# Patient Record
Sex: FEMALE | Race: BLACK OR AFRICAN AMERICAN | NOT HISPANIC OR LATINO | Employment: OTHER | URBAN - METROPOLITAN AREA
[De-identification: names, ages, dates, MRNs, and addresses within clinical notes are randomized per-mention and may not be internally consistent; named-entity substitution may affect disease eponyms.]

---

## 2017-01-19 ENCOUNTER — TELEPHONE (OUTPATIENT)
Dept: GASTROENTEROLOGY | Facility: CLINIC | Age: 82
End: 2017-01-19

## 2017-01-19 RX ORDER — LANSOPRAZOLE 30 MG/1
30 CAPSULE, DELAYED RELEASE ORAL EVERY MORNING
Qty: 30 CAPSULE | Refills: 0 | Status: SHIPPED | OUTPATIENT
Start: 2017-01-19 | End: 2017-02-03 | Stop reason: SDUPTHER

## 2017-01-19 NOTE — TELEPHONE ENCOUNTER
Called pt back. Pt states her lansoprazole needs a PA and she needs to stay on this medication as she has tried mutiple other meds and this med works well for her. Advised that we will work on getting the PA and she is due for an annual appt. Pt verb understanding. Appt made for 2/14/17 at 10:30 AM. Medication e-scribed as pt should be out of refills. We will work on PA once received.

## 2017-01-19 NOTE — TELEPHONE ENCOUNTER
----- Message from Haile Montiel sent at 1/19/2017  1:02 PM EST -----  Regarding: CHANGES IN MED   Contact: [phone]  DR. THORPE- PT CALLED STATED INSURANCE  WILL NOT COVER THE LANSOPRAZOLE 30 MG  PT ASKING FOR A DIFFERENT MEDICATION. [facility]# [phone]

## 2017-01-27 RX ORDER — LANSOPRAZOLE 30 MG/1
CAPSULE, DELAYED RELEASE ORAL
Qty: 30 CAPSULE | Refills: 10 | OUTPATIENT
Start: 2017-01-27

## 2017-01-30 ENCOUNTER — DOCUMENTATION (OUTPATIENT)
Dept: GASTROENTEROLOGY | Facility: CLINIC | Age: 82
End: 2017-01-30

## 2017-02-03 ENCOUNTER — TELEPHONE (OUTPATIENT)
Dept: GASTROENTEROLOGY | Facility: CLINIC | Age: 82
End: 2017-02-03

## 2017-02-03 RX ORDER — LANSOPRAZOLE 30 MG/1
CAPSULE, DELAYED RELEASE ORAL
Qty: 30 CAPSULE | Refills: 0 | Status: SHIPPED | OUTPATIENT
Start: 2017-02-03 | End: 2017-02-14 | Stop reason: SDUPTHER

## 2017-02-03 NOTE — TELEPHONE ENCOUNTER
----- Message from Maci Pritchard sent at 2/3/2017 10:08 AM EST -----  Regarding: PT CALLED - REFILL  Contact: [phone]  SEE PENDING REFILL REQUEST FOR LANSOPRAZOLE. STATED [facility] HAS APPROVED. PLEASE CALL SCRIPT IN TODAY. PT IS OUT OF MEDS. [facility]. THANKS

## 2017-02-03 NOTE — TELEPHONE ENCOUNTER
----- Message from Maci Pritchard sent at 2/3/2017 10:08 AM EST -----  Regarding: PT CALLED - REFILL  Contact: [phone]  SEE PENDING REFILL REQUEST FOR LANSOPRAZOLE. STATED [facility] HAS APPROVED. PLEASE CALL SCRIPT IN TODAY. PT IS OUT OF MEDS. [facility]. THANKS

## 2017-02-03 NOTE — TELEPHONE ENCOUNTER
Medication e-scribed.     Called pt and advised that her medication has been called in to her [facility] pharmacy. Pt verb understanding.

## 2017-02-14 ENCOUNTER — OFFICE VISIT (OUTPATIENT)
Dept: GASTROENTEROLOGY | Facility: CLINIC | Age: 82
End: 2017-02-14

## 2017-02-14 VITALS — HEIGHT: 65 IN | BODY MASS INDEX: 43.22 KG/M2 | WEIGHT: 259.4 LBS

## 2017-02-14 DIAGNOSIS — K21.9 GASTROESOPHAGEAL REFLUX DISEASE WITHOUT ESOPHAGITIS: Primary | ICD-10-CM

## 2017-02-14 PROCEDURE — 99212 OFFICE O/P EST SF 10 MIN: CPT | Performed by: INTERNAL MEDICINE

## 2017-02-14 RX ORDER — BENAZEPRIL HYDROCHLORIDE 20 MG/1
40 TABLET ORAL DAILY
COMMUNITY
End: 2021-11-16

## 2017-02-14 RX ORDER — LANSOPRAZOLE 30 MG/1
30 CAPSULE, DELAYED RELEASE ORAL DAILY
Qty: 30 CAPSULE | Refills: 11 | Status: SHIPPED | OUTPATIENT
Start: 2017-02-14 | End: 2018-01-25 | Stop reason: SDUPTHER

## 2017-02-14 NOTE — PROGRESS NOTES
Chief Complaint   Patient presents with   • Heartburn       Angelic Cardona is a  81 y.o. female here for a follow up visit for GERD.    HPI  Patient with history of GERD doing rather well on current regimen.  Patient may need to be removed now for here for follow-up.  Patient does report occasional early satiety particularly with high gluten foods like bagels.  Otherwise doing well.  Past Medical History   Diagnosis Date   • GERD (gastroesophageal reflux disease)    • Hyperlipidemia    • Hypertension          Current Outpatient Prescriptions:   •  amLODIPine (NORVASC) 10 MG tablet, Take 10 mg by mouth Daily., Disp: , Rfl:   •  benazepril (LOTENSIN) 20 MG tablet, Take 40 mg by mouth Daily., Disp: , Rfl:   •  Calcium Carbonate-Vit D-Min (CALCIUM 1200 PO), Take  by mouth., Disp: , Rfl:   •  carvedilol (COREG) 25 MG tablet, Take 25 mg by mouth 2 (Two) Times a Day With Meals., Disp: , Rfl:   •  DICLOFENAC PO, Take 75 mg by mouth., Disp: , Rfl:   •  Fexofenadine HCl (ALLERGY 24-HR PO), Take  by mouth., Disp: , Rfl:   •  furosemide (LASIX) 20 MG tablet, Take 20 mg by mouth 2 (Two) Times a Day., Disp: , Rfl:   •  lansoprazole (PREVACID) 30 MG capsule, Take 1 capsule by mouth Daily., Disp: 30 capsule, Rfl: 11  •  methyldopa (ALDOMET) 500 MG tablet, Take 500 mg by mouth 3 (Three) Times a Day., Disp: , Rfl:   •  Multiple Vitamins-Minerals (MULTIVITAMIN ADULT PO), Take  by mouth., Disp: , Rfl:   •  Simethicone (GAS RELIEF 80 PO), Take  by mouth., Disp: , Rfl:   •  timolol (TIMOPTIC-XR) 0.25 % ophthalmic gel-forming, 1 drop Daily., Disp: , Rfl:   •  albuterol (PROVENTIL HFA;VENTOLIN HFA) 108 (90 BASE) MCG/ACT inhaler, Inhale 2 puffs Every 4 (Four) Hours As Needed for wheezing., Disp: 1 inhaler, Rfl: 0  •  amLODIPine-benazepril (LOTREL) 5-40 MG per capsule, Take 1 capsule by mouth Daily., Disp: , Rfl:   •  azithromycin (ZITHROMAX) 250 MG tablet, Take 2 tablets the first day, then 1 tablet daily for 4 days., Disp: 6 tablet, Rfl:  0  •  guaiFENesin (MUCINEX) 600 MG 12 hr tablet, Take 2 tablets by mouth 2 (Two) Times a Day., Disp: 30 tablet, Rfl: 0  •  meloxicam (MOBIC) 15 MG tablet, Take 15 mg by mouth Daily., Disp: , Rfl:     No Known Allergies    Social History     Social History   • Marital status:      Spouse name: N/A   • Number of children: N/A   • Years of education: N/A     Occupational History   • Not on file.     Social History Main Topics   • Smoking status: Never Smoker   • Smokeless tobacco: Not on file   • Alcohol use No   • Drug use: No   • Sexual activity: Not on file     Other Topics Concern   • Not on file     Social History Narrative       Family History   Problem Relation Age of Onset   • Colon cancer Father        Review of Systems   Constitutional: Negative.    HENT: Negative.    Respiratory: Negative.    Cardiovascular: Negative.    Gastrointestinal: Negative.    Endocrine: Negative.    Musculoskeletal: Negative.    Skin: Negative.        There were no vitals filed for this visit.    Physical Exam   Constitutional: She is oriented to person, place, and time. She appears well-developed and well-nourished.   HENT:   Head: Normocephalic and atraumatic.   Eyes: EOM are normal. Pupils are equal, round, and reactive to light. No scleral icterus.   Cardiovascular: Normal rate, regular rhythm and normal heart sounds.  Exam reveals no gallop and no friction rub.    No murmur heard.  Pulmonary/Chest: Effort normal. She has no wheezes. She has no rales.   Abdominal: Soft. Bowel sounds are normal. She exhibits no distension and no mass. There is no tenderness. No hernia.   Musculoskeletal: Normal range of motion.   Neurological: She is alert and oriented to person, place, and time. No cranial nerve deficit.   Skin: Skin is dry.   Psychiatric: She has a normal mood and affect. Her behavior is normal.       No visits with results within 2 Month(s) from this visit.  Latest known visit with results is:    Hospital Outpatient  Visit on 01/11/2016   Component Date Value Ref Range Status   • CONVERTED (HISTORICAL) CASE TYPE 01/11/2016 Surgical Pathology   Final   • CONVERTED (HISTORICAL) ACCESSION N* 01/11/2016    Final   • CONVERTED (HISTORICAL) RESULT STAT* 01/11/2016 FINAL   Final   • CONVERTED (HISTORICAL) SPECIMEN DE* 01/11/2016 DESC. COLON POLYP lc   Final       Angelic was seen today for heartburn.    Diagnoses and all orders for this visit:    Gastroesophageal reflux disease without esophagitis    Other orders  -     lansoprazole (PREVACID) 30 MG capsule; Take 1 capsule by mouth Daily.      Patient here for medical refill.  Patient doing well on her current PPI regimen.  Patient does have some issues with gas and constipation but as long as she takes a probiotic and her other medications she does rather well.  Patient does report some early fullness and bloating.  Patient has reviewed smaller more frequent meals is an 8 to improve digestion.  Patient also told to try avoiding high gluten foods which may promote bloating.  We'll follow-up clinically and renew her PPI as needed.

## 2018-01-25 ENCOUNTER — TELEPHONE (OUTPATIENT)
Dept: GASTROENTEROLOGY | Facility: CLINIC | Age: 83
End: 2018-01-25

## 2018-01-25 RX ORDER — LANSOPRAZOLE 30 MG/1
30 CAPSULE, DELAYED RELEASE ORAL DAILY
Qty: 30 CAPSULE | Refills: 0 | Status: SHIPPED | OUTPATIENT
Start: 2018-01-25 | End: 2018-02-20 | Stop reason: SDUPTHER

## 2018-01-25 NOTE — TELEPHONE ENCOUNTER
Returned patient's phone call. She states she is out of her Lansoprazole. Advised she will need her yearly f/u visit with Dr. Acosta. F/u visit scheduled for 2/20/18@0930.  Patient verb understanding and is in agreement with the plan. Lansoprazole e-scribed to patient's pharmacy. She refused NP visit preferred seeing Dr. Acosta.

## 2018-01-25 NOTE — TELEPHONE ENCOUNTER
----- Message from Maci Pritchard sent at 1/25/2018  2:07 PM EST -----  Regarding: PT CALLED - REFILL ON LANSOPRAZOLE  Contact: [phone]  PT STATED PHARM HAS FAXED REQUEST.

## 2018-01-26 ENCOUNTER — PRIOR AUTHORIZATION (OUTPATIENT)
Dept: GASTROENTEROLOGY | Facility: CLINIC | Age: 83
End: 2018-01-26

## 2018-02-20 ENCOUNTER — OFFICE VISIT (OUTPATIENT)
Dept: GASTROENTEROLOGY | Facility: CLINIC | Age: 83
End: 2018-02-20

## 2018-02-20 VITALS
BODY MASS INDEX: 43.49 KG/M2 | TEMPERATURE: 97.5 F | WEIGHT: 261 LBS | HEIGHT: 65 IN | SYSTOLIC BLOOD PRESSURE: 132 MMHG | DIASTOLIC BLOOD PRESSURE: 78 MMHG

## 2018-02-20 DIAGNOSIS — K21.9 GASTROESOPHAGEAL REFLUX DISEASE WITHOUT ESOPHAGITIS: Primary | ICD-10-CM

## 2018-02-20 PROCEDURE — 99212 OFFICE O/P EST SF 10 MIN: CPT | Performed by: INTERNAL MEDICINE

## 2018-02-20 RX ORDER — LANSOPRAZOLE 30 MG/1
30 CAPSULE, DELAYED RELEASE ORAL DAILY
Qty: 90 CAPSULE | Refills: 3 | Status: SHIPPED | OUTPATIENT
Start: 2018-02-20 | End: 2019-02-16 | Stop reason: SDUPTHER

## 2018-02-20 NOTE — PROGRESS NOTES
Chief Complaint   Patient presents with   • Heartburn       Angelic Cardona is a  82 y.o. female here for a follow up visit for GERD.    HPI    Patient 82-year-old female with history of GERD as well as hyperlipidemia and hypertension here for med refill.  Patient currently reports no GI complaints on current medication.  Patient denies nausea vomiting no change in bowel habits doing well otherwise.    Past Medical History:   Diagnosis Date   • GERD (gastroesophageal reflux disease)    • Hyperlipidemia    • Hypertension          Current Outpatient Prescriptions:   •  amLODIPine (NORVASC) 10 MG tablet, Take 10 mg by mouth Daily., Disp: , Rfl:   •  apixaban (ELIQUIS) 5 MG tablet tablet, Take 5 mg by mouth 2 (Two) Times a Day., Disp: , Rfl:   •  benazepril (LOTENSIN) 20 MG tablet, Take 40 mg by mouth Daily., Disp: , Rfl:   •  Calcium Carbonate-Vit D-Min (CALCIUM 1200 PO), Take  by mouth., Disp: , Rfl:   •  carvedilol (COREG) 25 MG tablet, Take 25 mg by mouth 2 (Two) Times a Day With Meals., Disp: , Rfl:   •  DICLOFENAC PO, Take 75 mg by mouth., Disp: , Rfl:   •  furosemide (LASIX) 20 MG tablet, Take 20 mg by mouth 2 (Two) Times a Day., Disp: , Rfl:   •  lansoprazole (PREVACID) 30 MG capsule, Take 1 capsule by mouth Daily., Disp: 90 capsule, Rfl: 3  •  methyldopa (ALDOMET) 500 MG tablet, Take 500 mg by mouth 3 (Three) Times a Day., Disp: , Rfl:   •  Multiple Vitamins-Minerals (MULTIVITAMIN ADULT PO), Take  by mouth., Disp: , Rfl:   •  Probiotic Product (DIGESTIVE ADVANTAGE PO), Take  by mouth., Disp: , Rfl:   •  Simethicone (GAS RELIEF 80 PO), Take  by mouth., Disp: , Rfl:   •  timolol (TIMOPTIC-XR) 0.25 % ophthalmic gel-forming, 1 drop Daily., Disp: , Rfl:   •  amLODIPine-benazepril (LOTREL) 5-40 MG per capsule, Take 1 capsule by mouth Daily., Disp: , Rfl:     Allergies   Allergen Reactions   • Shellfish-Derived Products        Social History     Social History   • Marital status:      Spouse name: N/A   • Number  of children: N/A   • Years of education: N/A     Occupational History   • Not on file.     Social History Main Topics   • Smoking status: Never Smoker   • Smokeless tobacco: Not on file   • Alcohol use No   • Drug use: No   • Sexual activity: Not on file     Other Topics Concern   • Not on file     Social History Narrative       Family History   Problem Relation Age of Onset   • Colon cancer Father        Review of Systems   Constitutional: Negative.    HENT: Negative.    Respiratory: Negative.    Cardiovascular: Negative.    Gastrointestinal: Negative.    Skin: Negative.    Hematological: Negative.        Vitals:    02/20/18 0946   BP: 132/78   Temp: 97.5 °F (36.4 °C)       Physical Exam    No visits with results within 2 Month(s) from this visit.  Latest known visit with results is:    Admission on 11/06/2017, Discharged on 11/06/2017   Component Date Value Ref Range Status   • Urine Culture 11/06/2017 Final report   Final   • Result 1 11/06/2017 No growth   Final   • Color 11/06/2017 Orange* Yellow, Straw, Dark Yellow, Bell Final   • Clarity, UA 11/06/2017 Slightly Cloudy* Clear Final   • Glucose, UA 11/06/2017 100 mg/dL* Negative, 1000 mg/dL (3+) mg/dL Final   • Bilirubin 11/06/2017 Negative  Negative Final   • Ketones, UA 11/06/2017 Negative  Negative Final   • Specific Gravity  11/06/2017 1.015  1.005 - 1.030 Final   • Blood, UA 11/06/2017 Negative  Negative Final   • pH, Urine 11/06/2017 6.0  5.0 - 8.0 Final   • Protein, POC 11/06/2017 Trace* Negative mg/dL Final   • Urobilinogen, UA 11/06/2017 1 E.U./dL * Normal Final   • Leukocytes 11/06/2017 Negative  Negative Final   • Nitrite, UA 11/06/2017 Positive* Negative Final       Angelic was seen today for heartburn.    Diagnoses and all orders for this visit:    Gastroesophageal reflux disease without esophagitis    Other orders  -     lansoprazole (PREVACID) 30 MG capsule; Take 1 capsule by mouth Daily.      Patient 82-year-old female with history GERD doing  very well on Prevacid.  Patient with no GI complaints will refill her meds and follow-up clinically.  Would recommend primary monitor bone density and if patient is noted with low density would recommend adding acidic juice or vitamin C with the calcium replacement.  We'll follow-up clinically for GERD.

## 2019-02-18 ENCOUNTER — PRIOR AUTHORIZATION (OUTPATIENT)
Dept: GASTROENTEROLOGY | Facility: CLINIC | Age: 84
End: 2019-02-18

## 2019-02-18 RX ORDER — LANSOPRAZOLE 30 MG/1
30 CAPSULE, DELAYED RELEASE ORAL DAILY
Qty: 30 CAPSULE | Refills: 0 | Status: SHIPPED | OUTPATIENT
Start: 2019-02-18 | End: 2019-03-16 | Stop reason: SDUPTHER

## 2019-03-18 RX ORDER — LANSOPRAZOLE 30 MG/1
30 CAPSULE, DELAYED RELEASE ORAL DAILY
Qty: 30 CAPSULE | Refills: 0 | Status: SHIPPED | OUTPATIENT
Start: 2019-03-18 | End: 2019-04-13 | Stop reason: SDUPTHER

## 2019-04-04 ENCOUNTER — OFFICE VISIT (OUTPATIENT)
Dept: GASTROENTEROLOGY | Facility: CLINIC | Age: 84
End: 2019-04-04

## 2019-04-04 VITALS
DIASTOLIC BLOOD PRESSURE: 68 MMHG | TEMPERATURE: 97.7 F | BODY MASS INDEX: 42.02 KG/M2 | HEIGHT: 65 IN | WEIGHT: 252.2 LBS | SYSTOLIC BLOOD PRESSURE: 116 MMHG

## 2019-04-04 DIAGNOSIS — K59.04 CHRONIC IDIOPATHIC CONSTIPATION: ICD-10-CM

## 2019-04-04 DIAGNOSIS — K21.00 GASTROESOPHAGEAL REFLUX DISEASE WITH ESOPHAGITIS: Primary | ICD-10-CM

## 2019-04-04 PROCEDURE — 99213 OFFICE O/P EST LOW 20 MIN: CPT | Performed by: INTERNAL MEDICINE

## 2019-04-04 RX ORDER — PRAVASTATIN SODIUM 10 MG
10 TABLET ORAL DAILY
Refills: 0 | COMMUNITY
Start: 2019-02-18 | End: 2021-11-16

## 2019-04-04 NOTE — PROGRESS NOTES
Chief Complaint   Patient presents with   • Abdominal Pain   • Constipation   • Rectal Bleeding       Angelic Cardona is a  83 y.o. female here for a follow up visit for GERD and chronic constipation.    HPI     Patient 83-year-old female with history of GERD hypertension hyperlipidemia reports doing very well on her Prevacid therapy for reflux.  Patient just got her prior off cleared last month and is doing well clinically.  Patient also reports however long history of self treated constipation with as needed MiraLAX that only partially effective.  Patient has noted little blood on the tissue when she wipes related to particular hard stools or straining.    Past Medical History:   Diagnosis Date   • GERD (gastroesophageal reflux disease)    • Hyperlipidemia    • Hypertension          Current Outpatient Medications:   •  amLODIPine (NORVASC) 10 MG tablet, Take 10 mg by mouth Daily., Disp: , Rfl:   •  amLODIPine-benazepril (LOTREL) 5-40 MG per capsule, Take 1 capsule by mouth Daily., Disp: , Rfl:   •  apixaban (ELIQUIS) 5 MG tablet tablet, Take 5 mg by mouth 2 (Two) Times a Day., Disp: , Rfl:   •  benazepril (LOTENSIN) 20 MG tablet, Take 40 mg by mouth Daily., Disp: , Rfl:   •  Calcium Carbonate-Vit D-Min (CALCIUM 1200 PO), Take  by mouth., Disp: , Rfl:   •  carvedilol (COREG) 25 MG tablet, Take 25 mg by mouth 2 (Two) Times a Day With Meals., Disp: , Rfl:   •  DICLOFENAC PO, Take 75 mg by mouth., Disp: , Rfl:   •  furosemide (LASIX) 20 MG tablet, Take 20 mg by mouth 2 (Two) Times a Day., Disp: , Rfl:   •  lansoprazole (PREVACID) 30 MG capsule, Take 1 capsule by mouth Daily., Disp: 30 capsule, Rfl: 0  •  methyldopa (ALDOMET) 500 MG tablet, Take 500 mg by mouth 3 (Three) Times a Day., Disp: , Rfl:   •  Multiple Vitamins-Minerals (MULTIVITAMIN ADULT PO), Take  by mouth., Disp: , Rfl:   •  pravastatin (PRAVACHOL) 10 MG tablet, Take 10 mg by mouth Daily., Disp: , Rfl: 0  •  Probiotic Product (DIGESTIVE ADVANTAGE PO), Take   by mouth., Disp: , Rfl:   •  Simethicone (GAS RELIEF 80 PO), Take  by mouth., Disp: , Rfl:   •  timolol (TIMOPTIC-XR) 0.25 % ophthalmic gel-forming, 1 drop Daily., Disp: , Rfl:   •  linaclotide (LINZESS) 145 MCG capsule capsule, Take 1 capsule by mouth Every Morning Before Breakfast., Disp: 30 capsule, Rfl: 0    Allergies   Allergen Reactions   • Shellfish-Derived Products        Social History     Socioeconomic History   • Marital status:      Spouse name: Not on file   • Number of children: Not on file   • Years of education: Not on file   • Highest education level: Not on file   Tobacco Use   • Smoking status: Never Smoker   Substance and Sexual Activity   • Alcohol use: No   • Drug use: No       Family History   Problem Relation Age of Onset   • Colon cancer Father        Review of Systems   Constitutional: Negative.    Respiratory: Negative.    Cardiovascular: Negative.    Gastrointestinal: Positive for abdominal pain, anal bleeding and constipation. Negative for abdominal distention, diarrhea and nausea.   Musculoskeletal: Negative.    Skin: Negative.    Hematological: Negative.        Vitals:    04/04/19 0858   BP: 116/68   Temp: 97.7 °F (36.5 °C)       Physical Exam   Constitutional: She is oriented to person, place, and time. She appears well-developed and well-nourished.   HENT:   Head: Normocephalic and atraumatic.   Eyes: Pupils are equal, round, and reactive to light. No scleral icterus.   Neck: Normal range of motion.   Cardiovascular: Normal rate, regular rhythm and normal heart sounds.   Pulmonary/Chest: Effort normal and breath sounds normal.   Abdominal: Soft. Bowel sounds are normal. She exhibits no shifting dullness, no distension, no pulsatile liver, no fluid wave, no abdominal bruit, no ascites, no pulsatile midline mass and no mass. There is no hepatosplenomegaly. There is no tenderness. There is no rigidity and no guarding. No hernia.   Musculoskeletal: Normal range of motion.    Lymphadenopathy:     She has no cervical adenopathy.   Neurological: She is alert and oriented to person, place, and time.   Skin: Skin is warm and dry. No rash noted.   Psychiatric: She has a normal mood and affect. Her behavior is normal. Thought content normal.   Nursing note and vitals reviewed.      No visits with results within 2 Month(s) from this visit.   Latest known visit with results is:   Admission on 11/06/2017, Discharged on 11/06/2017   Component Date Value Ref Range Status   • Urine Culture 11/06/2017 Final report   Final   • Result 1 11/06/2017 No growth   Final   • Color 11/06/2017 Orange* Yellow, Straw, Dark Yellow, Bell Final   • Clarity, UA 11/06/2017 Slightly Cloudy* Clear Final   • Glucose, UA 11/06/2017 100 mg/dL* Negative, 1000 mg/dL (3+) mg/dL Final   • Bilirubin 11/06/2017 Negative  Negative Final   • Ketones, UA 11/06/2017 Negative  Negative Final   • Specific Gravity  11/06/2017 1.015  1.005 - 1.030 Final   • Blood, UA 11/06/2017 Negative  Negative Final   • pH, Urine 11/06/2017 6.0  5.0 - 8.0 Final   • Protein, POC 11/06/2017 Trace* Negative mg/dL Final   • Urobilinogen, UA 11/06/2017 1 E.U./dL * Normal Final   • Leukocytes 11/06/2017 Negative  Negative Final   • Nitrite, UA 11/06/2017 Positive* Negative Final       Angelic was seen today for abdominal pain, constipation and rectal bleeding.    Diagnoses and all orders for this visit:    Gastroesophageal reflux disease with esophagitis    Chronic idiopathic constipation    Other orders  -     linaclotide (LINZESS) 145 MCG capsule capsule; Take 1 capsule by mouth Every Morning Before Breakfast.      Patient 83-year-old female with history of GERD doing well on her Prevacid therapy.  Does report chronic constipation of late has noted some intermittent rectal bleeding related to hard stools.  Patient taking intermittent MiraLAX with only partial success.  At this point would recommend adding Linzess 145 mcg daily and follow clinical  response.  We will adjust medication to affect.

## 2019-04-05 ENCOUNTER — TELEPHONE (OUTPATIENT)
Dept: GASTROENTEROLOGY | Facility: CLINIC | Age: 84
End: 2019-04-05

## 2019-04-15 RX ORDER — LANSOPRAZOLE 30 MG/1
CAPSULE, DELAYED RELEASE ORAL
Qty: 90 CAPSULE | Refills: 2 | Status: SHIPPED | OUTPATIENT
Start: 2019-04-15 | End: 2020-01-22

## 2019-04-29 ENCOUNTER — TELEPHONE (OUTPATIENT)
Dept: GASTROENTEROLOGY | Facility: CLINIC | Age: 84
End: 2019-04-29

## 2019-04-29 NOTE — TELEPHONE ENCOUNTER
Returned patient's phone call. She states she needs a prescription for Linzess and samples. Advised will send a script into her pharmacy and will give her 2 boxes of Linzess(10 days) samples. Advised will be at the  for her to . She verb understanding.

## 2019-04-29 NOTE — TELEPHONE ENCOUNTER
----- Message from Haile Montiel sent at 4/29/2019  9:55 AM EDT -----  Regarding: CAROL   Contact: [phone]  ASKING TO SPEAK WITH AN NURSE.

## 2019-05-10 ENCOUNTER — TELEPHONE (OUTPATIENT)
Dept: GASTROENTEROLOGY | Facility: CLINIC | Age: 84
End: 2019-05-10

## 2019-05-10 NOTE — TELEPHONE ENCOUNTER
Called pt back. Pt states she did really well on Linzess 145mcg daily for a while and she was having a BM every day. She states now, she feels like her bowels are strating to bind up again. She has not had a BM since Tuesday. She did not have a BM on Sunday, but had one Monday and Tuesday. The BM on Tuesday was not hard or difficult to pass. Advised can send an update to the NP here for recs, she may not want to go up to the next dose just yet since she has not been very constipated but will see what she has to say. Pt states she has taken Miralax in the past and has some at home. Would it hurt to take the medication in addition to Linzess? Advised no, she can try adding Miralax to the Linzess regimen to see if that can help and she can call back Monday with an update. Pt verb understanding.

## 2019-05-10 NOTE — TELEPHONE ENCOUNTER
5/10/2019 11:16 AM EDT -----  Contact: [phone]  From: Haile Montiel  To: [facility]  Subject: LINZESS 145 MG                                   PT STATED MEDCATION IS NOT WORK AND. NO BOW LE MOVEMENT FOR 2 DAYS

## 2019-05-20 ENCOUNTER — TELEPHONE (OUTPATIENT)
Dept: GASTROENTEROLOGY | Facility: CLINIC | Age: 84
End: 2019-05-20

## 2019-05-20 NOTE — TELEPHONE ENCOUNTER
Called pt back. Pt states the Linzess is not working too well for her. She states she is taking Linzess 145 mcg daily and has also tried taking Miralax one twice as needed as well but not it is not working either. She states she is going 3-4 days without a BM. She states it is giving her a lot of gas, and it feels like she needs to have a movement, but not letting her bowels move. Advised will update Dr Karla Bernardo's NP and will call back with recs. Pt verb understanding.

## 2019-05-20 NOTE — TELEPHONE ENCOUNTER
----- Message from Charbel Chauhan sent at 5/20/2019  9:39 AM EDT -----  Regarding: pt called   Contact: [phone]  Pt is calling stating the medication LINZESS is not working for her & is asking for a call back

## 2019-05-22 NOTE — TELEPHONE ENCOUNTER
Lets have the patient try Linzess 290 mcg once daily.  Other treatments for constipation include increased physical activity, adequate rest, adequate hydration with 6-8 glasses of water per day, and high fiber diet.  She can come by and  samples.

## 2019-05-22 NOTE — TELEPHONE ENCOUNTER
Called pt and advised of the note from Tova. She verb understanding and states she has an entire month of Linzess 145mcg so she will double up on the med and will call back with an update in one week.

## 2019-06-21 ENCOUNTER — TELEPHONE (OUTPATIENT)
Dept: GASTROENTEROLOGY | Facility: CLINIC | Age: 84
End: 2019-06-21

## 2019-06-21 NOTE — TELEPHONE ENCOUNTER
----- Message from Charbel Chauhan sent at 6/21/2019 11:20 AM EDT -----  Regarding: pt called   Contact: [phone]  Pt is calling about her medication linzess, says she might be constipated

## 2019-06-21 NOTE — TELEPHONE ENCOUNTER
Per LEÓN Bernardo: I recommend 1 capful of MiraLAX twice daily over the weekend and call us on Monday with an update.  Patient needs office visit next week if possible

## 2019-06-21 NOTE — TELEPHONE ENCOUNTER
"Returned patient's phone call. She states the Linzess 290 mcg is not working. She states she has had to use Miralax to try and get her bowels to move. She states the last time she had a good BM was on Monday. She states she has been on Linzess 290 mcg for a month and it \"make her feel as if her bowels are locked\".  She states she has been drinking water but nothing seems to help. She does complain of abdominal cramping and passing gas.  Advised  Patient will send an update to the NP for advisement. She verb understanding.  "

## 2019-06-21 NOTE — TELEPHONE ENCOUNTER
Patient called, advised as per Tova TRAORE's note. She verb understanding and will call back on Monday with an update.

## 2019-09-17 ENCOUNTER — HOSPITAL ENCOUNTER (OUTPATIENT)
Dept: GENERAL RADIOLOGY | Facility: HOSPITAL | Age: 84
Discharge: HOME | End: 2019-09-17
Admitting: INTERNAL MEDICINE

## 2019-09-17 ENCOUNTER — OFFICE VISIT (OUTPATIENT)
Dept: GASTROENTEROLOGY | Facility: CLINIC | Age: 84
End: 2019-09-17

## 2019-09-17 VITALS
WEIGHT: 246.2 LBS | BODY MASS INDEX: 41.02 KG/M2 | TEMPERATURE: 97.8 F | HEIGHT: 65 IN | DIASTOLIC BLOOD PRESSURE: 70 MMHG | SYSTOLIC BLOOD PRESSURE: 114 MMHG

## 2019-09-17 DIAGNOSIS — K59.04 CHRONIC IDIOPATHIC CONSTIPATION: ICD-10-CM

## 2019-09-17 DIAGNOSIS — K59.04 CHRONIC IDIOPATHIC CONSTIPATION: Primary | ICD-10-CM

## 2019-09-17 PROCEDURE — 99213 OFFICE O/P EST LOW 20 MIN: CPT | Performed by: INTERNAL MEDICINE

## 2019-09-17 PROCEDURE — 74019 RADEX ABDOMEN 2 VIEWS: CPT

## 2019-09-17 RX ORDER — DRONEDARONE 400 MG/1
TABLET, FILM COATED ORAL
Refills: 5 | COMMUNITY
Start: 2019-06-15 | End: 2021-11-16

## 2019-09-17 RX ORDER — AMIODARONE HYDROCHLORIDE 200 MG/1
200 TABLET ORAL
COMMUNITY
Start: 2019-08-28

## 2019-09-17 NOTE — PROGRESS NOTES
Chief Complaint   Patient presents with   • Hemorrhoids   • Rectal Bleeding       Angelic Cardona is a  84 y.o. female here for a follow up visit for chronic idiopathic constipation and hemorrhoidal bleeding.    HPI    Patient 84-year-old female with history of hyperlipidemia hypertension presenting with ongoing constipation and hemorrhoidal bleeding.  Patient reports blood when she wipes.  Colonoscopy this year revealed some mild internal hemorrhoids and symptoms consistent with her constipation issues.  Patient did well for a month on Linzess 145 but then stools stopped passing.  They remain soft but apparently poor motility led to retention.  Patient then went back to the MiraLAX which historically had not helped and it has not now.  Patient here for follow-up.    Past Medical History:   Diagnosis Date   • GERD (gastroesophageal reflux disease)    • Hyperlipidemia    • Hypertension          Current Outpatient Medications:   •  amiodarone (PACERONE) 200 MG tablet, Take 200 mg by mouth., Disp: , Rfl:   •  amLODIPine (NORVASC) 10 MG tablet, Take 10 mg by mouth Daily., Disp: , Rfl:   •  amLODIPine-benazepril (LOTREL) 5-40 MG per capsule, Take 1 capsule by mouth Daily., Disp: , Rfl:   •  apixaban (ELIQUIS) 5 MG tablet tablet, Take 5 mg by mouth 2 (Two) Times a Day., Disp: , Rfl:   •  benazepril (LOTENSIN) 20 MG tablet, Take 40 mg by mouth Daily., Disp: , Rfl:   •  carvedilol (COREG) 25 MG tablet, Take 25 mg by mouth 2 (Two) Times a Day With Meals., Disp: , Rfl:   •  DICLOFENAC PO, Take 75 mg by mouth., Disp: , Rfl:   •  furosemide (LASIX) 20 MG tablet, Take 20 mg by mouth 2 (Two) Times a Day., Disp: , Rfl:   •  lansoprazole (PREVACID) 30 MG capsule, TAKE ONE CAPSULE BY MOUTH DAILY, Disp: 90 capsule, Rfl: 2  •  MULTAQ 400 MG tablet, TAKE 1 TABLET BY MOUTH 2 (TWO) TIMES DAILY WITH MEALS FOR 30 DAYS., Disp: , Rfl: 5  •  Multiple Vitamins-Minerals (MULTIVITAMIN ADULT PO), Take  by mouth., Disp: , Rfl:   •  pravastatin  (PRAVACHOL) 10 MG tablet, Take 10 mg by mouth Daily., Disp: , Rfl: 0  •  Probiotic Product (DIGESTIVE ADVANTAGE PO), Take  by mouth., Disp: , Rfl:   •  timolol (TIMOPTIC-XR) 0.25 % ophthalmic gel-forming, 1 drop Daily., Disp: , Rfl:   •  Calcium Carbonate-Vit D-Min (CALCIUM 1200 PO), Take  by mouth., Disp: , Rfl:   •  linaclotide (LINZESS) 145 MCG capsule capsule, Take 1 capsule by mouth Every Morning Before Breakfast., Disp: 30 capsule, Rfl: 11  •  methyldopa (ALDOMET) 500 MG tablet, Take 500 mg by mouth 3 (Three) Times a Day., Disp: , Rfl:   •  Prucalopride Succinate (MOTEGRITY) 2 MG tablet, Take 1 tablet by mouth Daily., Disp: 30 tablet, Rfl: 0  •  Simethicone (GAS RELIEF 80 PO), Take  by mouth., Disp: , Rfl:     Allergies   Allergen Reactions   • Shellfish-Derived Products        Social History     Socioeconomic History   • Marital status:      Spouse name: Not on file   • Number of children: Not on file   • Years of education: Not on file   • Highest education level: Not on file   Tobacco Use   • Smoking status: Never Smoker   • Smokeless tobacco: Never Used   Substance and Sexual Activity   • Alcohol use: No   • Drug use: No       Family History   Problem Relation Age of Onset   • Colon cancer Father        Review of Systems   Constitutional: Negative.    Respiratory: Negative.    Cardiovascular: Negative.    Gastrointestinal: Positive for abdominal distention, anal bleeding and constipation. Negative for abdominal pain, diarrhea, nausea and rectal pain.   Musculoskeletal: Negative.    Skin: Negative.    Hematological: Negative.        Vitals:    09/17/19 1321   BP: 114/70   Temp: 97.8 °F (36.6 °C)       Physical Exam   Constitutional: She is oriented to person, place, and time. She appears well-developed and well-nourished.   HENT:   Head: Normocephalic and atraumatic.   Eyes: Pupils are equal, round, and reactive to light. No scleral icterus.   Cardiovascular: Normal rate, regular rhythm and normal  heart sounds.   Pulmonary/Chest: Effort normal and breath sounds normal.   Abdominal: Soft. Bowel sounds are normal. She exhibits no distension and no mass. There is no tenderness. No hernia.   Neurological: She is alert and oriented to person, place, and time.   Skin: Skin is warm and dry.   Psychiatric: She has a normal mood and affect. Her behavior is normal.   Vitals reviewed.      No visits with results within 2 Month(s) from this visit.   Latest known visit with results is:   Admission on 11/06/2017, Discharged on 11/06/2017   Component Date Value Ref Range Status   • Urine Culture 11/06/2017 Final report   Final   • Result 1 11/06/2017 No growth   Final   • Color 11/06/2017 Orange* Yellow, Straw, Dark Yellow, Bell Final   • Clarity, UA 11/06/2017 Slightly Cloudy* Clear Final   • Glucose, UA 11/06/2017 100 mg/dL* Negative, 1000 mg/dL (3+) mg/dL Final   • Bilirubin 11/06/2017 Negative  Negative Final   • Ketones, UA 11/06/2017 Negative  Negative Final   • Specific Gravity  11/06/2017 1.015  1.005 - 1.030 Final   • Blood, UA 11/06/2017 Negative  Negative Final   • pH, Urine 11/06/2017 6.0  5.0 - 8.0 Final   • Protein, POC 11/06/2017 Trace* Negative mg/dL Final   • Urobilinogen, UA 11/06/2017 1 E.U./dL * Normal Final   • Leukocytes 11/06/2017 Negative  Negative Final   • Nitrite, UA 11/06/2017 Positive* Negative Final       Angelic was seen today for hemorrhoids and rectal bleeding.    Diagnoses and all orders for this visit:    Chronic idiopathic constipation  -     XR abdomen flat and upright; Future    Other orders  -     Prucalopride Succinate (MOTEGRITY) 2 MG tablet; Take 1 tablet by mouth Daily.      Patient reports did well for the first month on 145 of Linzess but then bowels started to reduce output.  Stools remain soft but did not seem to be passing.  Patient then went back to taking MiraLAX and stopped the Linzess but then she would go several days without any bowel movements.  At this point will  resume the Linzess 145 and add [place] gritty samples to see if the added motility would help with the stool passage.  Will confirm underlying issues with flat and upright abdomen to see how much retained stool is present.  We will follow-up clinically based on response to therapy.

## 2019-10-15 ENCOUNTER — TELEPHONE (OUTPATIENT)
Dept: GASTROENTEROLOGY | Facility: CLINIC | Age: 84
End: 2019-10-15

## 2019-10-15 NOTE — TELEPHONE ENCOUNTER
Returned patient's phone call. She states Dr. Acosta put her on Motegrity and Linzess to help with her bowel movements. She states its working well but cannot afford the Motegrity. Advised will give her another month's worth of samples per verbal order of Dr. Acosta. She verb understanding and will  the meds tomorrow.

## 2019-10-15 NOTE — TELEPHONE ENCOUNTER
----- Message from Jewels Marcial sent at 10/15/2019 11:58 AM EDT -----  Regarding: VOICEMAIL  Contact: [phone]  WOULD LIKE TO DISCUSS SUZETTE YEBOAH RN

## 2019-12-10 ENCOUNTER — TELEPHONE (OUTPATIENT)
Dept: GASTROENTEROLOGY | Facility: CLINIC | Age: 84
End: 2019-12-10

## 2019-12-10 NOTE — TELEPHONE ENCOUNTER
Patient called. She questioned if her paperwork for financial assistance arrived in the office. Advised paperwork has been received.

## 2019-12-10 NOTE — TELEPHONE ENCOUNTER
----- Message from Ailin Wood sent at 12/10/2019  4:00 PM EST -----  Regarding: Medication   Pt called said she is an  pt calling for chet about medication ? Did not leave med name call [phone]

## 2020-01-17 ENCOUNTER — TELEPHONE (OUTPATIENT)
Dept: GASTROENTEROLOGY | Facility: CLINIC | Age: 85
End: 2020-01-17

## 2020-01-17 NOTE — TELEPHONE ENCOUNTER
Returned phone call. She states she has been denied coverage for her Linzess through [employer]. She states she did receive approval for her Motegrity. She requests a prescription for her LInzess to be sent to her [facility] pharmacy. Prescription e-scribed. Update sent to Dr. Acosta.

## 2020-01-17 NOTE — TELEPHONE ENCOUNTER
----- Message from Jewels Marcial sent at 1/17/2020 10:49 AM EST -----  Regarding: VOICEMAIL  Contact: [phone]  [facility] ISSUES WITH LINZESS

## 2020-01-22 RX ORDER — LANSOPRAZOLE 30 MG/1
CAPSULE, DELAYED RELEASE ORAL
Qty: 90 CAPSULE | Refills: 0 | Status: SHIPPED | OUTPATIENT
Start: 2020-01-22 | End: 2020-04-13

## 2020-04-13 RX ORDER — LANSOPRAZOLE 30 MG/1
CAPSULE, DELAYED RELEASE ORAL
Qty: 90 CAPSULE | Refills: 0 | Status: SHIPPED | OUTPATIENT
Start: 2020-04-13 | End: 2020-07-14

## 2020-07-14 RX ORDER — LANSOPRAZOLE 30 MG/1
CAPSULE, DELAYED RELEASE ORAL
Qty: 90 CAPSULE | Refills: 0 | Status: SHIPPED | OUTPATIENT
Start: 2020-07-14 | End: 2020-10-15 | Stop reason: SDUPTHER

## 2020-08-06 ENCOUNTER — OFFICE VISIT (OUTPATIENT)
Dept: GASTROENTEROLOGY | Facility: CLINIC | Age: 85
End: 2020-08-06

## 2020-08-06 VITALS — WEIGHT: 246.8 LBS | TEMPERATURE: 97 F | BODY MASS INDEX: 41.07 KG/M2

## 2020-08-06 DIAGNOSIS — K21.9 GASTROESOPHAGEAL REFLUX DISEASE WITHOUT ESOPHAGITIS: Primary | ICD-10-CM

## 2020-08-06 DIAGNOSIS — K59.04 CHRONIC IDIOPATHIC CONSTIPATION: ICD-10-CM

## 2020-08-06 PROCEDURE — 99212 OFFICE O/P EST SF 10 MIN: CPT | Performed by: INTERNAL MEDICINE

## 2020-08-06 NOTE — PROGRESS NOTES
Chief Complaint   Patient presents with   • Constipation       Angelic Cardona is a  85 y.o. female here for a follow up visit for chronic idiopathic constipation.    HPI     Patient 85-year-old female with history of GERD as well as hyperlipidemia and hypertension doing well from her GERD perspective.  Patient however still having issues with her bowels reports to for stool of the day often is hard and dry followed by more normal stools and then the stools will actually become loose.  Patient reports daughter had recommended trying some MiraLAX when she really has trouble going to the bathroom she will take some at bedtime to good effect.  Patient here for further recommendations.    Past Medical History:   Diagnosis Date   • GERD (gastroesophageal reflux disease)    • Hyperlipidemia    • Hypertension          Current Outpatient Medications:   •  amiodarone (PACERONE) 200 MG tablet, Take 200 mg by mouth., Disp: , Rfl:   •  amLODIPine (NORVASC) 10 MG tablet, Take 10 mg by mouth Daily., Disp: , Rfl:   •  amLODIPine-benazepril (LOTREL) 5-40 MG per capsule, Take 1 capsule by mouth Daily., Disp: , Rfl:   •  apixaban (ELIQUIS) 5 MG tablet tablet, Take 5 mg by mouth 2 (Two) Times a Day., Disp: , Rfl:   •  benazepril (LOTENSIN) 20 MG tablet, Take 40 mg by mouth Daily., Disp: , Rfl:   •  carvedilol (COREG) 25 MG tablet, Take 25 mg by mouth 2 (Two) Times a Day With Meals., Disp: , Rfl:   •  DICLOFENAC PO, Take 75 mg by mouth., Disp: , Rfl:   •  furosemide (LASIX) 20 MG tablet, Take 20 mg by mouth 2 (Two) Times a Day., Disp: , Rfl:   •  lansoprazole (PREVACID) 30 MG capsule, TAKE ONE CAPSULE BY MOUTH DAILY, Disp: 90 capsule, Rfl: 0  •  linaclotide (LINZESS) 145 MCG capsule capsule, Take 1 capsule by mouth Every Morning Before Breakfast., Disp: 30 capsule, Rfl: 8  •  methyldopa (ALDOMET) 500 MG tablet, Take 500 mg by mouth 3 (Three) Times a Day., Disp: , Rfl:   •  Multiple Vitamins-Minerals (MULTIVITAMIN ADULT PO), Take  by  mouth., Disp: , Rfl:   •  pravastatin (PRAVACHOL) 10 MG tablet, Take 10 mg by mouth Daily., Disp: , Rfl: 0  •  Prucalopride Succinate (MOTEGRITY) 2 MG tablet, Take 1 tablet by mouth Daily., Disp: 30 tablet, Rfl: 0  •  timolol (TIMOPTIC-XR) 0.25 % ophthalmic gel-forming, 1 drop Daily., Disp: , Rfl:   •  Calcium Carbonate-Vit D-Min (CALCIUM 1200 PO), Take  by mouth., Disp: , Rfl:   •  MULTAQ 400 MG tablet, TAKE 1 TABLET BY MOUTH 2 (TWO) TIMES DAILY WITH MEALS FOR 30 DAYS., Disp: , Rfl: 5  •  Probiotic Product (DIGESTIVE ADVANTAGE PO), Take  by mouth., Disp: , Rfl:   •  Simethicone (GAS RELIEF 80 PO), Take  by mouth., Disp: , Rfl:     Allergies   Allergen Reactions   • Shellfish-Derived Products        Social History     Socioeconomic History   • Marital status:      Spouse name: Not on file   • Number of children: Not on file   • Years of education: Not on file   • Highest education level: Not on file   Tobacco Use   • Smoking status: Never Smoker   • Smokeless tobacco: Never Used   Substance and Sexual Activity   • Alcohol use: No   • Drug use: No       Family History   Problem Relation Age of Onset   • Colon cancer Father        Review of Systems   Constitutional: Negative.    Respiratory: Negative.    Cardiovascular: Negative.    Gastrointestinal: Positive for constipation and diarrhea. Negative for abdominal distention, abdominal pain, anal bleeding, blood in stool, nausea, rectal pain and vomiting.   Musculoskeletal: Negative.    Skin: Negative.    Hematological: Negative.        Vitals:    08/06/20 0939   Temp: 97 °F (36.1 °C)       Physical Exam   Constitutional: She is oriented to person, place, and time. She appears well-developed and well-nourished.   HENT:   Head: Normocephalic and atraumatic.   Eyes: Pupils are equal, round, and reactive to light. No scleral icterus.   Cardiovascular: Normal rate, regular rhythm and normal heart sounds.   Pulmonary/Chest: Effort normal and breath sounds normal. She  has no wheezes. She has no rales.   Abdominal: Soft. Bowel sounds are normal. She exhibits no distension and no mass. There is no tenderness. No hernia.   Neurological: She is alert and oriented to person, place, and time.   Skin: Skin is warm and dry. No rash noted.   Psychiatric: She has a normal mood and affect. Her behavior is normal.   Vitals reviewed.      No visits with results within 2 Month(s) from this visit.   Latest known visit with results is:   Admission on 11/06/2017, Discharged on 11/06/2017   Component Date Value Ref Range Status   • Urine Culture 11/06/2017 Final report   Final   • Result 1 11/06/2017 No growth   Final   • Color 11/06/2017 Orange* Yellow, Straw, Dark Yellow, Bell Final   • Clarity, UA 11/06/2017 Slightly Cloudy* Clear Final   • Glucose, UA 11/06/2017 100 mg/dL* Negative, 1000 mg/dL (3+) mg/dL Final   • Bilirubin 11/06/2017 Negative  Negative Final   • Ketones, UA 11/06/2017 Negative  Negative Final   • Specific Gravity  11/06/2017 1.015  1.005 - 1.030 Final   • Blood, UA 11/06/2017 Negative  Negative Final   • pH, Urine 11/06/2017 6.0  5.0 - 8.0 Final   • Protein, POC 11/06/2017 Trace* Negative mg/dL Final   • Urobilinogen, UA 11/06/2017 1 E.U./dL * Normal Final   • Leukocytes 11/06/2017 Negative  Negative Final   • Nitrite, UA 11/06/2017 Positive* Negative Final       Angelic was seen today for constipation.    Diagnoses and all orders for this visit:    Gastroesophageal reflux disease without esophagitis    Chronic idiopathic constipation    Other orders  -     linaclotide (LINZESS) 145 MCG capsule capsule; Take 1 capsule by mouth Every Morning Before Breakfast.      Patient with GERD and chronic idiopathic constipation still reporting hard stools to start the day despite the Linzess and Motegrity.  Once patient starts to move the hard stools though patient starts to forming looser and looser stools.  That will happen throughout the day.  Will add MiraLAX 17 g in the evening to  see if we can prevent the hard stool from forming and improve bowel flow.  We will follow-up clinically.

## 2020-10-12 ENCOUNTER — TELEPHONE (OUTPATIENT)
Dept: GASTROENTEROLOGY | Facility: CLINIC | Age: 85
End: 2020-10-12

## 2020-10-12 NOTE — TELEPHONE ENCOUNTER
----- Message from Avery Bach sent at 10/12/2020 11:09 AM EDT -----  Regarding: lansoprazole (PREVACID) 30 MG  Contact: [phone]  Pt needs prescription called in.        [facility] [id number] [place] [address] AT [facility] [address] - [phone] PH - [phone] FX  Phone:  [phone]  Fax:  [phone]

## 2020-10-15 ENCOUNTER — TELEPHONE (OUTPATIENT)
Dept: GASTROENTEROLOGY | Facility: CLINIC | Age: 85
End: 2020-10-15

## 2020-10-15 RX ORDER — LANSOPRAZOLE 30 MG/1
30 CAPSULE, DELAYED RELEASE ORAL DAILY
Qty: 90 CAPSULE | Refills: 3 | Status: SHIPPED | OUTPATIENT
Start: 2020-10-15 | End: 2021-05-26 | Stop reason: HOSPADM

## 2020-10-15 NOTE — TELEPHONE ENCOUNTER
----- Message from Avery Durán Rep sent at 10/15/2020 11:40 AM EDT -----  Regarding: med    Pt only has one pill left , can something be called in??    lansoprazole (PREVACID) 30 MG capsule      Pharmacy:  [facility] [address] [place] [address] - [address] [facility] AT [facility] [address] - [phone] PH - [phone] FX  Phone:  [phone]  Fax:  [phone]  Address:  [address] [facility] [address]

## 2020-10-25 RX ORDER — LANSOPRAZOLE 30 MG/1
30 CAPSULE, DELAYED RELEASE ORAL DAILY
Qty: 90 CAPSULE | Refills: 3 | Status: SHIPPED | OUTPATIENT
Start: 2020-10-25 | End: 2021-11-16

## 2020-11-05 ENCOUNTER — OFFICE VISIT (OUTPATIENT)
Dept: GASTROENTEROLOGY | Facility: CLINIC | Age: 85
End: 2020-11-05

## 2020-11-05 VITALS — HEIGHT: 65 IN | BODY MASS INDEX: 40.69 KG/M2 | WEIGHT: 244.2 LBS | TEMPERATURE: 98 F

## 2020-11-05 DIAGNOSIS — K59.04 CHRONIC IDIOPATHIC CONSTIPATION: Primary | ICD-10-CM

## 2020-11-05 DIAGNOSIS — K21.9 GASTROESOPHAGEAL REFLUX DISEASE WITHOUT ESOPHAGITIS: ICD-10-CM

## 2020-11-05 PROCEDURE — 99213 OFFICE O/P EST LOW 20 MIN: CPT | Performed by: INTERNAL MEDICINE

## 2020-11-05 RX ORDER — BRIMONIDINE TARTRATE/TIMOLOL 0.2%-0.5%
DROPS OPHTHALMIC (EYE)
COMMUNITY
Start: 2020-11-03

## 2020-11-05 RX ORDER — DICLOFENAC SODIUM 75 MG/1
TABLET, DELAYED RELEASE ORAL
COMMUNITY
Start: 2020-09-10

## 2020-11-05 NOTE — PROGRESS NOTES
Chief Complaint   Patient presents with   • Follow-up   • Constipation   • Heartburn       Angelic Cardona is a  85 y.o. female here for a follow up visit for constipation and reflux.    HPI     Patient 85-year-old female with history of hypertension, hyperlipidemia and GERD along with chronic idiopathic constipation here for follow-up.  Patient's reflux remains asymptomatic as long as she takes her Prevacid daily.  Patient continues with issues with her bowels however.  Last visit we added MiraLAX to prevent the hard stool she begins a day with but apparently now is just starting each day with her 1 and only diarrhea movement.  Patient denies any bright red blood per rectum or melena noted.  Patient stool color is normal but still having only loose stools once a day.  Patient here for further recommendations.    Past Medical History:   Diagnosis Date   • GERD (gastroesophageal reflux disease)    • Hyperlipidemia    • Hypertension          Current Outpatient Medications:   •  amiodarone (PACERONE) 200 MG tablet, Take 200 mg by mouth., Disp: , Rfl:   •  amLODIPine (NORVASC) 10 MG tablet, Take 10 mg by mouth Daily., Disp: , Rfl:   •  amLODIPine-benazepril (LOTREL) 5-40 MG per capsule, Take 1 capsule by mouth Daily., Disp: , Rfl:   •  apixaban (ELIQUIS) 5 MG tablet tablet, Take 5 mg by mouth 2 (Two) Times a Day., Disp: , Rfl:   •  benazepril (LOTENSIN) 20 MG tablet, Take 40 mg by mouth Daily., Disp: , Rfl:   •  carvedilol (COREG) 25 MG tablet, Take 25 mg by mouth 2 (Two) Times a Day With Meals., Disp: , Rfl:   •  Combigan 0.2-0.5 % ophthalmic solution, , Disp: , Rfl:   •  diclofenac (VOLTAREN) 75 MG EC tablet, TAKE 1 TABLET BY MOUTH TWICE A DAY WITH FOOD AS NEEDED FOR ARTHRITIS, Disp: , Rfl:   •  DICLOFENAC PO, Take 75 mg by mouth., Disp: , Rfl:   •  furosemide (LASIX) 20 MG tablet, Take 20 mg by mouth 2 (Two) Times a Day., Disp: , Rfl:   •  lansoprazole (PREVACID) 30 MG capsule, Take 1 capsule by mouth Daily., Disp: 90  capsule, Rfl: 3  •  linaclotide (LINZESS) 72 MCG capsule capsule, Take 1 capsule by mouth Every Morning Before Breakfast., Disp: 12 capsule, Rfl: 0  •  MULTAQ 400 MG tablet, TAKE 1 TABLET BY MOUTH 2 (TWO) TIMES DAILY WITH MEALS FOR 30 DAYS., Disp: , Rfl: 5  •  Multiple Vitamins-Minerals (MULTIVITAMIN ADULT PO), Take  by mouth., Disp: , Rfl:   •  Probiotic Product (DIGESTIVE ADVANTAGE PO), Take  by mouth., Disp: , Rfl:   •  Prucalopride Succinate (MOTEGRITY) 2 MG tablet, Take 1 tablet by mouth Daily., Disp: 30 tablet, Rfl: 0  •  timolol (TIMOPTIC-XR) 0.25 % ophthalmic gel-forming, 1 drop Daily., Disp: , Rfl:   •  Calcium Carbonate-Vit D-Min (CALCIUM 1200 PO), Take  by mouth., Disp: , Rfl:   •  lansoprazole (PREVACID) 30 MG capsule, Take 1 capsule by mouth Daily., Disp: 90 capsule, Rfl: 3  •  methyldopa (ALDOMET) 500 MG tablet, Take 500 mg by mouth 3 (Three) Times a Day., Disp: , Rfl:   •  pravastatin (PRAVACHOL) 10 MG tablet, Take 10 mg by mouth Daily., Disp: , Rfl: 0  •  Simethicone (GAS RELIEF 80 PO), Take  by mouth., Disp: , Rfl:     Allergies   Allergen Reactions   • Shellfish-Derived Products        Social History     Socioeconomic History   • Marital status:      Spouse name: Not on file   • Number of children: Not on file   • Years of education: Not on file   • Highest education level: Not on file   Tobacco Use   • Smoking status: Never Smoker   • Smokeless tobacco: Never Used   Substance and Sexual Activity   • Alcohol use: No   • Drug use: No       Family History   Problem Relation Age of Onset   • Colon cancer Father        Review of Systems   Constitutional: Negative.    Respiratory: Negative.    Cardiovascular: Negative.    Gastrointestinal: Positive for diarrhea. Negative for abdominal distention, abdominal pain, anal bleeding, blood in stool, constipation, nausea, rectal pain and vomiting.   Musculoskeletal: Negative.    Skin: Negative.    Hematological: Negative.        Vitals:    11/05/20 1006    Temp: 98 °F (36.7 °C)       Physical Exam  Vitals signs reviewed.   Constitutional:       Appearance: She is well-developed.   HENT:      Head: Normocephalic and atraumatic.   Eyes:      General: No scleral icterus.     Pupils: Pupils are equal, round, and reactive to light.   Cardiovascular:      Rate and Rhythm: Normal rate and regular rhythm.      Heart sounds: Normal heart sounds.   Pulmonary:      Effort: Pulmonary effort is normal. No respiratory distress.      Breath sounds: Normal breath sounds.   Abdominal:      General: Bowel sounds are normal. There is no distension.      Palpations: Abdomen is soft. There is no mass.      Tenderness: There is no abdominal tenderness.      Hernia: No hernia is present.   Skin:     General: Skin is warm and dry.      Coloration: Skin is not jaundiced.      Findings: No rash.   Neurological:      General: No focal deficit present.      Mental Status: She is alert and oriented to person, place, and time.      Cranial Nerves: No cranial nerve deficit.   Psychiatric:         Behavior: Behavior normal.         Thought Content: Thought content normal.         Judgment: Judgment normal.         No visits with results within 2 Month(s) from this visit.   Latest known visit with results is:   Admission on 11/06/2017, Discharged on 11/06/2017   Component Date Value Ref Range Status   • Urine Culture 11/06/2017 Final report   Final   • Result 1 11/06/2017 No growth   Final   • Color 11/06/2017 Orange* Yellow, Straw, Dark Yellow, Bell Final   • Clarity, UA 11/06/2017 Slightly Cloudy* Clear Final   • Glucose, UA 11/06/2017 100 mg/dL* Negative, 1000 mg/dL (3+) mg/dL Final   • Bilirubin 11/06/2017 Negative  Negative Final   • Ketones, UA 11/06/2017 Negative  Negative Final   • Specific Gravity  11/06/2017 1.015  1.005 - 1.030 Final   • Blood, UA 11/06/2017 Negative  Negative Final   • pH, Urine 11/06/2017 6.0  5.0 - 8.0 Final   • Protein, POC 11/06/2017 Trace* Negative mg/dL Final   •  Urobilinogen, UA 11/06/2017 1 E.U./dL * Normal Final   • Leukocytes 11/06/2017 Negative  Negative Final   • Nitrite, UA 11/06/2017 Positive* Negative Final       Diagnoses and all orders for this visit:    1. Chronic idiopathic constipation (Primary)    2. Gastroesophageal reflux disease without esophagitis    Other orders  -     linaclotide (LINZESS) 72 MCG capsule capsule; Take 1 capsule by mouth Every Morning Before Breakfast.  Dispense: 12 capsule; Refill: 0      Patient reports reflux is well controlled on current therapy.  Constipation has resolved to well.  Patient reports since adding MiraLAX at bedtime 1 bowel movement a day is all loose and first thing in the morning.  Previously noted constipation followed by loose stools apparently was all on the same bowel movement.  Patient typically prior to this has had 1 hard stool immediately followed by loose movements.  Of the MiraLAX has helped prevent the hard stool while she is having now is loose.  Will begin tapering her other medications as MiraLAX at bedtime seems to help the hard bowel movement.  We will cut the Linzess in half for now to 72 and monitor clinically if further reductions needed will DC the Linzess and just stay with the Motegrity and MiraLAX.  We will continue to adjust for symptoms.

## 2020-11-19 ENCOUNTER — TELEPHONE (OUTPATIENT)
Dept: GASTROENTEROLOGY | Facility: CLINIC | Age: 85
End: 2020-11-19

## 2020-11-19 NOTE — TELEPHONE ENCOUNTER
Returned patient's phone call. She states she had started Linzess 72 mcg as prescribed by Dr. Acosta. She states Linzess 72 mg is not strong enough, complaining of constipation.  Patient's current bowel regimen is: Linzess 72 mcg and Motegrity 2 mg every morning and Miralax every evening.   Advised an update will be sent to Dr. Acosta. She verb understanding.

## 2020-11-19 NOTE — TELEPHONE ENCOUNTER
----- Message from Noemi Leyva sent at 11/18/2020  4:06 PM EST -----  Contact: [phone]  Patient has questions regarding her medication dose.  Please call.

## 2020-12-02 ENCOUNTER — TELEPHONE (OUTPATIENT)
Dept: GASTROENTEROLOGY | Facility: CLINIC | Age: 85
End: 2020-12-02

## 2020-12-02 NOTE — TELEPHONE ENCOUNTER
----- Message from Avery Cedillo sent at 12/2/2020 12:34 PM EST -----  Regarding: linaclotide (LINZESS) 72 MCG capsule capsule  Contact: [phone]  Prescription for linaclotide (LINZESS) 72 MCG capsule capsule  was not received by the pharmacy and PT hasnt been able to get meds since 11/05. please fill prescription ASAP PT has been out for weeks    Pharmacy:  [facility] [address] [place] [address] - [address] [place] [address] [address] - [phone]  - [phone] FX  Phone:  [phone]  Fax:  [phone]  Address:  [address] [place] [id number]

## 2020-12-07 NOTE — TELEPHONE ENCOUNTER
I refilled her Linzess but it gave me a note saying she was on Motegrity as well.  Can we confirm if she is taking Motegrity?

## 2020-12-17 ENCOUNTER — TELEPHONE (OUTPATIENT)
Dept: GASTROENTEROLOGY | Facility: CLINIC | Age: 85
End: 2020-12-17

## 2020-12-29 ENCOUNTER — TELEPHONE (OUTPATIENT)
Dept: GASTROENTEROLOGY | Facility: CLINIC | Age: 85
End: 2020-12-29

## 2020-12-29 NOTE — TELEPHONE ENCOUNTER
----- Message from Avery Bach Rep sent at 12/29/2020 10:31 AM EST -----  Regarding: Christina  Contact: [phone]  Pt is needing more samples.

## 2021-03-09 DIAGNOSIS — Z23 IMMUNIZATION DUE: ICD-10-CM

## 2021-04-06 ENCOUNTER — TELEPHONE (OUTPATIENT)
Dept: GASTROENTEROLOGY | Facility: CLINIC | Age: 86
End: 2021-04-06

## 2021-04-06 NOTE — TELEPHONE ENCOUNTER
----- Message from Avery Malik sent at 4/6/2021 12:56 PM EDT -----  Regarding: Trulance  Contact: [phone]  Linzess is to expensive and insurance is suggesting Trulance. Please call pt to advise      linaclotide (LINZESS) 72 MCG capsule capsule 90 capsule 3 12/7/2020    Sig - Route: Take 1 capsule by mouth Every Morning Before Breakfast. - Oral   Sent to pharmacy as: linaCLOtide 72 MCG Oral Capsule (LINZESS)   E-Prescribing Status: Receipt confirmed by pharmacy (12/7/2020 12:26 PM EST)   Pharmacy    [facility] [id number] [place] [address] AT [facility] [address] - [phone]  - [phone] FX

## 2021-04-13 ENCOUNTER — TELEPHONE (OUTPATIENT)
Dept: GASTROENTEROLOGY | Facility: CLINIC | Age: 86
End: 2021-04-13

## 2021-04-19 RX ORDER — LUBIPROSTONE 24 UG/1
24 CAPSULE ORAL 2 TIMES DAILY WITH MEALS
Qty: 60 CAPSULE | Refills: 11 | Status: SHIPPED | OUTPATIENT
Start: 2021-04-19 | End: 2021-11-16

## 2021-05-04 ENCOUNTER — OFFICE VISIT (OUTPATIENT)
Dept: GASTROENTEROLOGY | Facility: CLINIC | Age: 86
End: 2021-05-04

## 2021-05-04 ENCOUNTER — TELEPHONE (OUTPATIENT)
Dept: GASTROENTEROLOGY | Facility: CLINIC | Age: 86
End: 2021-05-04

## 2021-05-04 VITALS — HEIGHT: 65 IN | BODY MASS INDEX: 42.52 KG/M2 | TEMPERATURE: 98.5 F | WEIGHT: 255.2 LBS

## 2021-05-04 DIAGNOSIS — K62.5 RECTAL BLEEDING: Primary | ICD-10-CM

## 2021-05-04 DIAGNOSIS — Z86.010 HISTORY OF ADENOMATOUS POLYP OF COLON: ICD-10-CM

## 2021-05-04 PROBLEM — Z86.0101 HISTORY OF ADENOMATOUS POLYP OF COLON: Status: ACTIVE | Noted: 2021-05-04

## 2021-05-04 PROCEDURE — 99213 OFFICE O/P EST LOW 20 MIN: CPT | Performed by: INTERNAL MEDICINE

## 2021-05-04 RX ORDER — VALSARTAN 320 MG/1
320 TABLET ORAL DAILY
COMMUNITY
Start: 2020-12-04 | End: 2021-12-04

## 2021-05-04 RX ORDER — PRUCALOPRIDE 2 MG/1
2 TABLET, FILM COATED ORAL DAILY
COMMUNITY
End: 2022-01-11

## 2021-05-04 RX ORDER — HYDROCORTISONE 25 MG/G
CREAM TOPICAL 2 TIMES DAILY
Qty: 28 G | Refills: 2 | Status: SHIPPED | OUTPATIENT
Start: 2021-05-04 | End: 2021-05-18

## 2021-05-04 RX ORDER — CLONIDINE HYDROCHLORIDE 0.1 MG/1
TABLET ORAL
COMMUNITY
Start: 2021-02-14 | End: 2023-01-09

## 2021-05-04 NOTE — PROGRESS NOTES
Chief Complaint   Patient presents with   • Follow-up   • Constipation   • Rectal Bleeding       Angelic Cardona is a  85 y.o. female here for a follow up visit for rectal bleeding and constipation.    HPI     Patient 85-year-old female with history of constipation, hypertension hyperlipidemia complaining of fatigue.  Patient found with mild drop in hemoglobin from 12-11.1 but does report intermittent rectal bleeding on straining since January.  Patient is due this year for follow-up colonoscopy due to history of colon polyps found in 2016.  Patient denies abdominal pain no nausea vomiting fever chills.    Past Medical History:   Diagnosis Date   • GERD (gastroesophageal reflux disease)    • Hyperlipidemia    • Hypertension          Current Outpatient Medications:   •  amiodarone (PACERONE) 200 MG tablet, Take 200 mg by mouth., Disp: , Rfl:   •  amLODIPine (NORVASC) 10 MG tablet, Take 10 mg by mouth Daily., Disp: , Rfl:   •  apixaban (ELIQUIS) 5 MG tablet tablet, Take 5 mg by mouth 2 (Two) Times a Day., Disp: , Rfl:   •  carvedilol (COREG) 25 MG tablet, Take 25 mg by mouth 2 (Two) Times a Day With Meals., Disp: , Rfl:   •  cloNIDine (CATAPRES) 0.1 MG tablet, TAKE 1 TABLET BY MOUTH THREE TIMES A DAY, Disp: , Rfl:   •  Combigan 0.2-0.5 % ophthalmic solution, , Disp: , Rfl:   •  diclofenac (VOLTAREN) 75 MG EC tablet, TAKE 1 TABLET BY MOUTH TWICE A DAY WITH FOOD AS NEEDED FOR ARTHRITIS, Disp: , Rfl:   •  furosemide (LASIX) 20 MG tablet, Take 20 mg by mouth 2 (Two) Times a Day., Disp: , Rfl:   •  lansoprazole (PREVACID) 30 MG capsule, Take 1 capsule by mouth Daily., Disp: 90 capsule, Rfl: 3  •  linaclotide (LINZESS) 72 MCG capsule capsule, Take 72 mcg by mouth Daily., Disp: , Rfl:   •  Multiple Vitamins-Minerals (MULTIVITAMIN ADULT PO), Take  by mouth., Disp: , Rfl:   •  Prucalopride Succinate (Motegrity) 2 MG tablet, Take 2 mg by mouth Daily., Disp: , Rfl:   •  valsartan (DIOVAN) 320 MG tablet, Take 320 mg by mouth  Daily., Disp: , Rfl:   •  amLODIPine-benazepril (LOTREL) 5-40 MG per capsule, Take 1 capsule by mouth Daily., Disp: , Rfl:   •  benazepril (LOTENSIN) 20 MG tablet, Take 40 mg by mouth Daily., Disp: , Rfl:   •  Calcium Carbonate-Vit D-Min (CALCIUM 1200 PO), Take  by mouth., Disp: , Rfl:   •  DICLOFENAC PO, Take 75 mg by mouth., Disp: , Rfl:   •  lansoprazole (PREVACID) 30 MG capsule, Take 1 capsule by mouth Daily., Disp: 90 capsule, Rfl: 3  •  lubiprostone (Amitiza) 24 MCG capsule, Take 1 capsule by mouth 2 (Two) Times a Day With Meals., Disp: 60 capsule, Rfl: 11  •  methyldopa (ALDOMET) 500 MG tablet, Take 500 mg by mouth 3 (Three) Times a Day., Disp: , Rfl:   •  MULTAQ 400 MG tablet, TAKE 1 TABLET BY MOUTH 2 (TWO) TIMES DAILY WITH MEALS FOR 30 DAYS., Disp: , Rfl: 5  •  pravastatin (PRAVACHOL) 10 MG tablet, Take 10 mg by mouth Daily., Disp: , Rfl: 0  •  Probiotic Product (DIGESTIVE ADVANTAGE PO), Take  by mouth., Disp: , Rfl:   •  Simethicone (GAS RELIEF 80 PO), Take  by mouth., Disp: , Rfl:   •  timolol (TIMOPTIC-XR) 0.25 % ophthalmic gel-forming, 1 drop Daily., Disp: , Rfl:     Allergies   Allergen Reactions   • Shellfish-Derived Products        Social History     Socioeconomic History   • Marital status:      Spouse name: Not on file   • Number of children: Not on file   • Years of education: Not on file   • Highest education level: Not on file   Tobacco Use   • Smoking status: Never Smoker   • Smokeless tobacco: Never Used   Substance and Sexual Activity   • Alcohol use: No   • Drug use: No       Family History   Problem Relation Age of Onset   • Colon cancer Father        Review of Systems   Constitutional: Negative.    Respiratory: Negative.    Cardiovascular: Negative.    Gastrointestinal: Positive for anal bleeding and constipation. Negative for abdominal distention, abdominal pain, diarrhea, nausea, rectal pain and vomiting.   Musculoskeletal: Positive for back pain and gait problem.   Skin:  Negative.    Hematological: Negative.        Vitals:    05/04/21 1459   Temp: 98.5 °F (36.9 °C)       Physical Exam  Vitals reviewed.   Constitutional:       Appearance: Normal appearance. She is well-developed. She is obese.   HENT:      Head: Normocephalic and atraumatic.   Eyes:      General: No scleral icterus.     Pupils: Pupils are equal, round, and reactive to light.   Cardiovascular:      Rate and Rhythm: Normal rate and regular rhythm.      Heart sounds: Normal heart sounds.   Pulmonary:      Effort: Pulmonary effort is normal. No respiratory distress.      Breath sounds: Normal breath sounds.   Abdominal:      General: Bowel sounds are normal. There is no distension.      Palpations: Abdomen is soft. There is no mass.      Tenderness: There is no abdominal tenderness.      Hernia: No hernia is present.   Skin:     General: Skin is warm and dry.      Coloration: Skin is not jaundiced.      Findings: No rash.   Neurological:      General: No focal deficit present.      Mental Status: She is alert and oriented to person, place, and time.   Psychiatric:         Behavior: Behavior normal.         Thought Content: Thought content normal.         No visits with results within 2 Month(s) from this visit.   Latest known visit with results is:   Admission on 11/06/2017, Discharged on 11/06/2017   Component Date Value Ref Range Status   • Urine Culture 11/06/2017 Final report   Final   • Result 1 11/06/2017 No growth   Final   • Color 11/06/2017 Orange* Yellow, Straw, Dark Yellow, Bell Final   • Clarity, UA 11/06/2017 Slightly Cloudy* Clear Final   • Glucose, UA 11/06/2017 100 mg/dL* Negative, 1000 mg/dL (3+) mg/dL Final   • Bilirubin 11/06/2017 Negative  Negative Final   • Ketones, UA 11/06/2017 Negative  Negative Final   • Specific Gravity  11/06/2017 1.015  1.005 - 1.030 Final   • Blood, UA 11/06/2017 Negative  Negative Final   • pH, Urine 11/06/2017 6.0  5.0 - 8.0 Final   • Protein, POC 11/06/2017 Trace*  Negative mg/dL Final   • Urobilinogen, UA 11/06/2017 1 E.U./dL * Normal Final   • Leukocytes 11/06/2017 Negative  Negative Final   • Nitrite, UA 11/06/2017 Positive* Negative Final       Diagnoses and all orders for this visit:    1. Rectal bleeding (Primary)  -     Case Request; Standing  -     Implement Anesthesia orders day of procedure.; Standing  -     Obtain informed consent; Standing  -     Case Request    2. History of adenomatous polyp of colon  -     Case Request; Standing  -     Implement Anesthesia orders day of procedure.; Standing  -     Obtain informed consent; Standing  -     Case Request      Patient 85-year-old female with history of GERD, hyperlipidemia and hypertension presenting with intermittent rectal bleeding with straining.  Patient does have a history of colon polyps due for colonoscopy.  Patient was found to be mildly anemic complaining of fatigue though.  Patient is on Eliquis daily status post total knee x3.  Will arrange colonoscopy and begin Anusol twice daily for possible hemorrhoidal bleeding.

## 2021-05-13 ENCOUNTER — TRANSCRIBE ORDERS (OUTPATIENT)
Dept: SLEEP MEDICINE | Facility: HOSPITAL | Age: 86
End: 2021-05-13

## 2021-05-13 DIAGNOSIS — Z01.818 OTHER SPECIFIED PRE-OPERATIVE EXAMINATION: Primary | ICD-10-CM

## 2021-05-24 ENCOUNTER — APPOINTMENT (OUTPATIENT)
Dept: LAB | Facility: HOSPITAL | Age: 86
End: 2021-05-24

## 2021-05-26 ENCOUNTER — ANESTHESIA EVENT (OUTPATIENT)
Dept: GASTROENTEROLOGY | Facility: HOSPITAL | Age: 86
End: 2021-05-26

## 2021-05-26 ENCOUNTER — ANESTHESIA (OUTPATIENT)
Dept: GASTROENTEROLOGY | Facility: HOSPITAL | Age: 86
End: 2021-05-26

## 2021-05-26 ENCOUNTER — HOSPITAL ENCOUNTER (OUTPATIENT)
Facility: HOSPITAL | Age: 86
Setting detail: HOSPITAL OUTPATIENT SURGERY
Discharge: HOME | End: 2021-05-26
Attending: INTERNAL MEDICINE | Admitting: INTERNAL MEDICINE

## 2021-05-26 VITALS
OXYGEN SATURATION: 95 % | HEIGHT: 65 IN | BODY MASS INDEX: 41.15 KG/M2 | HEART RATE: 51 BPM | DIASTOLIC BLOOD PRESSURE: 64 MMHG | SYSTOLIC BLOOD PRESSURE: 120 MMHG | RESPIRATION RATE: 14 BRPM | WEIGHT: 247 LBS

## 2021-05-26 DIAGNOSIS — K62.5 RECTAL BLEEDING: ICD-10-CM

## 2021-05-26 DIAGNOSIS — Z86.010 HISTORY OF ADENOMATOUS POLYP OF COLON: ICD-10-CM

## 2021-05-26 PROCEDURE — G0105 COLORECTAL SCRN; HI RISK IND: HCPCS | Performed by: INTERNAL MEDICINE

## 2021-05-26 PROCEDURE — 25010000002 PROPOFOL 10 MG/ML EMULSION: Performed by: NURSE ANESTHETIST, CERTIFIED REGISTERED

## 2021-05-26 RX ORDER — PROMETHAZINE HYDROCHLORIDE 25 MG/1
25 TABLET ORAL ONCE AS NEEDED
Status: DISCONTINUED | OUTPATIENT
Start: 2021-05-26 | End: 2021-05-26 | Stop reason: HOSPADM

## 2021-05-26 RX ORDER — PROMETHAZINE HYDROCHLORIDE 25 MG/1
25 SUPPOSITORY RECTAL ONCE AS NEEDED
Status: DISCONTINUED | OUTPATIENT
Start: 2021-05-26 | End: 2021-05-26 | Stop reason: HOSPADM

## 2021-05-26 RX ORDER — SODIUM CHLORIDE, SODIUM LACTATE, POTASSIUM CHLORIDE, CALCIUM CHLORIDE 600; 310; 30; 20 MG/100ML; MG/100ML; MG/100ML; MG/100ML
1000 INJECTION, SOLUTION INTRAVENOUS CONTINUOUS
Status: DISCONTINUED | OUTPATIENT
Start: 2021-05-26 | End: 2021-05-26 | Stop reason: HOSPADM

## 2021-05-26 RX ORDER — PROPOFOL 10 MG/ML
VIAL (ML) INTRAVENOUS CONTINUOUS PRN
Status: DISCONTINUED | OUTPATIENT
Start: 2021-05-26 | End: 2021-05-26 | Stop reason: SURG

## 2021-05-26 RX ORDER — GLYCOPYRROLATE 0.2 MG/ML
INJECTION INTRAMUSCULAR; INTRAVENOUS AS NEEDED
Status: DISCONTINUED | OUTPATIENT
Start: 2021-05-26 | End: 2021-05-26 | Stop reason: SURG

## 2021-05-26 RX ORDER — PROPOFOL 10 MG/ML
VIAL (ML) INTRAVENOUS AS NEEDED
Status: DISCONTINUED | OUTPATIENT
Start: 2021-05-26 | End: 2021-05-26 | Stop reason: SURG

## 2021-05-26 RX ORDER — HYDROCORTISONE 25 MG/G
CREAM TOPICAL 2 TIMES DAILY
Qty: 28 G | Refills: 2 | Status: SHIPPED | OUTPATIENT
Start: 2021-05-26 | End: 2021-06-09

## 2021-05-26 RX ADMIN — GLYCOPYRROLATE 0.2 MG: 0.2 INJECTION INTRAMUSCULAR; INTRAVENOUS at 09:00

## 2021-05-26 RX ADMIN — SODIUM CHLORIDE, POTASSIUM CHLORIDE, SODIUM LACTATE AND CALCIUM CHLORIDE 1000 ML: 600; 310; 30; 20 INJECTION, SOLUTION INTRAVENOUS at 08:23

## 2021-05-26 RX ADMIN — PROPOFOL 80 MG: 10 INJECTION, EMULSION INTRAVENOUS at 08:53

## 2021-05-26 RX ADMIN — PROPOFOL 140 MCG/KG/MIN: 10 INJECTION, EMULSION INTRAVENOUS at 08:53

## 2021-05-26 NOTE — ANESTHESIA PREPROCEDURE EVALUATION
Anesthesia Evaluation     Patient summary reviewed and Nursing notes reviewed                Airway   Mallampati: III  TM distance: >3 FB  Neck ROM: full  Possible difficult intubation  Dental - normal exam     Pulmonary - normal exam   Cardiovascular - normal exam    Rhythm: regular  Rate: normal    (+) hypertension 2 medications or greater, dysrhythmias Paroxysmal Atrial Fib, hyperlipidemia,     PE comment: Feels like NSR at present    Neuro/Psych  (+) numbness,       ROS Comment: CTS  GI/Hepatic/Renal/Endo    (+) obesity, morbid obesity, GERD, GI bleeding ,     Musculoskeletal     Abdominal   (+) obese,    Substance History      OB/GYN          Other                        Anesthesia Plan    ASA 3     MAC     intravenous induction     Anesthetic plan, all risks, benefits, and alternatives have been provided, discussed and informed consent has been obtained with: patient.    Plan discussed with CRNA.

## 2021-05-26 NOTE — ANESTHESIA POSTPROCEDURE EVALUATION
Patient: Angelic Cardona    Procedure Summary     Date: 05/26/21 Room / Location:  [facility]    Anesthesia Start: 0847 Anesthesia Stop: 0915    Procedure: COLONOSCOPY TO CECUM AND INTO TI (N/A ) Diagnosis:       Rectal bleeding      History of adenomatous polyp of colon      Internal and external hemorrhoids without complication      (Rectal bleeding [K62.5])      (History of adenomatous polyp of colon [Z86.010])    Surgeons: Saurav Acosta MD Provider: Ho Cardenas MD    Anesthesia Type: MAC ASA Status: 3          Anesthesia Type: MAC    Vitals  No vitals data found for the desired time range.          Post Anesthesia Care and Evaluation    Patient location during evaluation: PHASE II  Patient participation: complete - patient participated  Level of consciousness: awake and alert  Pain management: adequate  Airway patency: patent  Anesthetic complications: No anesthetic complications  PONV Status: none  Cardiovascular status: acceptable and hemodynamically stable  Respiratory status: acceptable, nonlabored ventilation and spontaneous ventilation  Hydration status: acceptable

## 2021-05-26 NOTE — BRIEF OP NOTE
COLONOSCOPY  Progress Note    Angelic Cardona  5/26/2021    Pre-op Diagnosis:   Rectal bleeding [K62.5]  History of adenomatous polyp of colon [Z86.010]       Post-Op Diagnosis Codes:     * Rectal bleeding [K62.5]     * History of adenomatous polyp of colon [Z86.010]     * Internal and external hemorrhoids without complication [K64.4, K64.8]    Procedure/CPT® Codes:        Procedure(s):  COLONOSCOPY TO CECUM AND INTO TI    Surgeon(s):  Saurav Acosta MD    Anesthesia: Monitored Anesthesia Care    Staff:   Endo Technician: Kirsten Scott  Endo Nurse: Nadine Bowens RN         Estimated Blood Loss: none    Urine Voided: * No values recorded between 5/26/2021  8:47 AM and 5/26/2021  9:10 AM *    Specimens:                None          Drains: * No LDAs found *    Findings: Colonoscopy to the terminal ileum with normal ileum mucosa.  Internal and external hemorrhoids noted.  Exam otherwise normal.    Complications: None          Saurav Acosta MD     Date: 5/26/2021  Time: 09:11 EDT

## 2021-07-02 ENCOUNTER — TELEPHONE (OUTPATIENT)
Dept: GASTROENTEROLOGY | Facility: CLINIC | Age: 86
End: 2021-07-02

## 2021-07-02 NOTE — TELEPHONE ENCOUNTER
----- Message from Avery Beckman sent at 7/2/2021  2:30 PM EDT -----  Regarding: Samples of Prescription  Contact: [phone]  Pt calling regarding if have samples of linaclotide (LINZESS) 72 MCG.  Please contact pt and advise if have any samples.  Cell Phone # [phone] or Home Phone # [phone] Thank You

## 2021-07-02 NOTE — TELEPHONE ENCOUNTER
Patient called. She states she needs samples of Linzess if it is available. States she has used Linzess 72 mcg and 145 mcg. Advised we do not have 72 mcg but do have 145. Advised will have to confirm with Dr. Acosta if 145 mcg are ok. She verb understanding.

## 2021-07-06 NOTE — TELEPHONE ENCOUNTER
Returned patient's phone call. Advised her samples are at the  for her to  at her convenience. She verb understanding.

## 2021-11-16 ENCOUNTER — OFFICE VISIT (OUTPATIENT)
Dept: GASTROENTEROLOGY | Facility: CLINIC | Age: 86
End: 2021-11-16

## 2021-11-16 VITALS — TEMPERATURE: 97.7 F | BODY MASS INDEX: 41.65 KG/M2 | WEIGHT: 250 LBS | HEIGHT: 65 IN

## 2021-11-16 DIAGNOSIS — R13.10 DYSPHAGIA, UNSPECIFIED TYPE: Primary | ICD-10-CM

## 2021-11-16 DIAGNOSIS — R10.13 DYSPEPSIA: ICD-10-CM

## 2021-11-16 DIAGNOSIS — K59.01 SLOW TRANSIT CONSTIPATION: ICD-10-CM

## 2021-11-16 PROCEDURE — 99214 OFFICE O/P EST MOD 30 MIN: CPT | Performed by: NURSE PRACTITIONER

## 2021-11-16 RX ORDER — PANTOPRAZOLE SODIUM 40 MG/1
40 TABLET, DELAYED RELEASE ORAL DAILY
Qty: 90 TABLET | Refills: 3 | Status: SHIPPED | OUTPATIENT
Start: 2021-11-16 | End: 2022-04-19 | Stop reason: SDUPTHER

## 2021-11-16 NOTE — PROGRESS NOTES
Chief Complaint   Patient presents with   • Constipation       HPI    Angelic Cardona is a  86 y.o. female here for a follow up visit for constipation on Linzess.    This patient follows with Dr. Acosta, new to me.    Underwent colonoscopy in May of this year for personal history of colon polyps and complaints of rectal bleeding found to have a normal ileum, nonbleeding prolapsed external and internal hemorrhoids otherwise normal.  Recall 5 years.    Patient still struggle with constipation.  Tells me she can spend up to 30 minutes on the toilet for bowel movement.  Passing small volume stools.  She is currently taking Motegrity 2 mg once daily and Linzess bouncing back and forth between 72 mcg of Linzess and 145 from samples in our office.  She feels like Linzess helps but she frequently has to supplement with MiraLAX.  No rectal bleeding currently.  No abdominal pain or rectal pain reported on visit today.    She goes on to complain of significant dyspepsia and occasional esophageal dysphagia despite lansoprazole 30 mg once daily.  She has been on this particular PPI for greater than 10 years.  Denies nausea or vomiting.  Appetite is good.  Her weight is stable.  BMI over 41.    Past Medical History:   Diagnosis Date   • Atrial fibrillation (HCC)    • Carpal tunnel syndrome    • Chronic constipation    • GERD (gastroesophageal reflux disease)    • Hyperlipidemia    • Hypertension        Past Surgical History:   Procedure Laterality Date   • CARPAL TUNNEL RELEASE     • COLONOSCOPY  01/11/2016    tics, NBIH, tubulovillous adenoma, HP polyp   • COLONOSCOPY N/A 5/26/2021    Procedure: COLONOSCOPY TO CECUM AND INTO TI;  Surgeon: Saurav Acosta MD;  Location: [facility] END[facility];  Service: Gastroenterology;  Laterality: N/A;  pre: history of colon polyps  post: hemorrhoids   • REPLACEMENT TOTAL KNEE      x 3   • TUBAL ABDOMINAL LIGATION     • UPPER GASTROINTESTINAL ENDOSCOPY  01/11/2016    HH, no specimens collected        Scheduled Meds:     Continuous Infusions: No current facility-administered medications for this visit.      PRN Meds:     Allergies   Allergen Reactions   • Shellfish-Derived Products Nausea And Vomiting       Social History     Socioeconomic History   • Marital status:    Tobacco Use   • Smoking status: Never Smoker   • Smokeless tobacco: Never Used   Substance and Sexual Activity   • Alcohol use: No   • Drug use: No       Family History   Problem Relation Age of Onset   • Colon cancer Father        Review of Systems   Constitutional: Negative for activity change, appetite change, fatigue, fever and unexpected weight change.   HENT: Positive for trouble swallowing.    Respiratory: Negative for apnea, cough, choking, chest tightness, shortness of breath and wheezing.    Cardiovascular: Negative for chest pain, palpitations and leg swelling.   Gastrointestinal: Positive for constipation. Negative for abdominal distention, abdominal pain, anal bleeding, blood in stool, diarrhea, nausea, rectal pain and vomiting.       Vitals:    11/16/21 0854   Temp: 97.7 °F (36.5 °C)       Physical Exam  Constitutional:       Appearance: She is well-developed.   Abdominal:      General: Bowel sounds are normal. There is no distension.      Palpations: Abdomen is soft. There is no mass.      Tenderness: There is no abdominal tenderness. There is no guarding.      Hernia: No hernia is present.   Skin:     General: Skin is warm and dry.      Capillary Refill: Capillary refill takes less than 2 seconds.   Neurological:      Mental Status: She is alert and oriented to person, place, and time.   Psychiatric:         Behavior: Behavior normal.       Assessment    Diagnoses and all orders for this visit:    1. Dysphagia, unspecified type (Primary)  -     FL esophagram complete; Future    2. Dyspepsia  -     FL esophagram complete; Future    3. Slow transit constipation    Other orders  -     pantoprazole (PROTONIX) 40 MG EC  tablet; Take 1 tablet by mouth Daily.  Dispense: 90 tablet; Refill: 3       Plan    Esophagram for further evaluation of dysphagia and dyspepsia.  Stop lansoprazole.  Protonix 40 mg once daily.  Follow an antireflux diet.    Regarding constipation, discussed transitioning her from Linzess which patient reports is expensive to lactulose but she is hesitant.  She would like to try samples of Linzess 290 mcg once daily which I provided her with.  Hopefully if Linzess improve bowel pattern we can have her stop Motegrity.    Further recommendations pending aforementioned work-up.  Follow-up Dr. Acosta next available.         CARLEE Nair  [facility]  [address] [place] [address]  Office: [phone]

## 2022-01-11 ENCOUNTER — TELEPHONE (OUTPATIENT)
Dept: GASTROENTEROLOGY | Facility: CLINIC | Age: 87
End: 2022-01-11

## 2022-01-11 RX ORDER — LINACLOTIDE 72 UG/1
CAPSULE, GELATIN COATED ORAL
Qty: 90 CAPSULE | Refills: 3 | Status: SHIPPED | OUTPATIENT
Start: 2022-01-11 | End: 2022-02-28

## 2022-01-11 NOTE — TELEPHONE ENCOUNTER
----- Message from Avery Patel sent at 1/11/2022 11:05 AM EST -----  Contact: [phone]  Pt is calling and needs a refill on her prescription. Of linzess if you could please give pt call or [phone]. Pt states she is completely out and needs a refill as soon as possible.

## 2022-02-15 ENCOUNTER — TELEPHONE (OUTPATIENT)
Dept: GASTROENTEROLOGY | Facility: CLINIC | Age: 87
End: 2022-02-15

## 2022-02-15 NOTE — TELEPHONE ENCOUNTER
----- Message from Shelly Avery Potts Rep sent at 2/15/2022 12:28 PM EST -----  Regarding: Linzess 72 MCG capsule capsule  Contact: [phone]  Pt is stating the Linzess isn't working well. She strains which makes her hemorrhoids come out and bleed but she doesn't have a bowel movement. Pt is wanting something to hold her off until her appointment on 03/10.

## 2022-02-15 NOTE — TELEPHONE ENCOUNTER
"Returned patient's phone call.   She states she has been taking Linzess 72 mcg daily. States it is not working well for her. Reports diarrhea, gas, bloat and continues to feel as if she needs to pass a BM immediately after having a BM.   Report diminished appetite too. States she is straining for a BM and her hemorrhoids have been \"popping\" out and bleeding. States she is using her Hydrocortisone cream for the hemorrhoids.   Advised an update will be sent to CARLEE Bernardo.  "

## 2022-02-18 NOTE — TELEPHONE ENCOUNTER
Pt is agreeable to try Trulance, and would like samples to try first.  Samples placed up front for her to

## 2022-02-28 ENCOUNTER — TELEPHONE (OUTPATIENT)
Dept: GASTROENTEROLOGY | Facility: CLINIC | Age: 87
End: 2022-02-28

## 2022-02-28 RX ORDER — PLECANATIDE 3 MG/1
1 TABLET ORAL DAILY
Qty: 90 TABLET | Refills: 3 | Status: SHIPPED | OUTPATIENT
Start: 2022-02-28 | End: 2022-04-19 | Stop reason: SDUPTHER

## 2022-03-01 ENCOUNTER — TELEPHONE (OUTPATIENT)
Dept: GASTROENTEROLOGY | Facility: CLINIC | Age: 87
End: 2022-03-01

## 2022-03-25 ENCOUNTER — TELEPHONE (OUTPATIENT)
Dept: GASTROENTEROLOGY | Facility: CLINIC | Age: 87
End: 2022-03-25

## 2022-03-25 NOTE — TELEPHONE ENCOUNTER
----- Message from Avery Dennis sent at 3/25/2022 12:27 PM EDT -----  Regarding: medications  Contact: [phone]  Pt states she sent an application into the office to get motegrity for free.   Can she get some samples ?  [phone]

## 2022-04-14 ENCOUNTER — TELEPHONE (OUTPATIENT)
Dept: GASTROENTEROLOGY | Facility: CLINIC | Age: 87
End: 2022-04-14

## 2022-04-14 NOTE — TELEPHONE ENCOUNTER
----- Message from Avery Patel sent at 4/13/2022  4:12 PM EDT -----  Contact: [phone]  Pt is calling and she is having some questions and concerns about some medication that she is taking and she just wants some info on this pt

## 2022-04-14 NOTE — TELEPHONE ENCOUNTER
Returned phone call to patient.   She questions if she should take Trulance or Motegrity for her bowels. Advised Tova prescribed Trulance in February.   Advised to stop the Motegrity and take Trulance. If she has any problems advised to call back.   She verb understanding.

## 2022-04-19 ENCOUNTER — OFFICE VISIT (OUTPATIENT)
Dept: GASTROENTEROLOGY | Facility: CLINIC | Age: 87
End: 2022-04-19

## 2022-04-19 VITALS
SYSTOLIC BLOOD PRESSURE: 123 MMHG | WEIGHT: 239.8 LBS | DIASTOLIC BLOOD PRESSURE: 75 MMHG | HEART RATE: 55 BPM | TEMPERATURE: 96.4 F | HEIGHT: 65 IN | BODY MASS INDEX: 39.95 KG/M2

## 2022-04-19 DIAGNOSIS — K59.04 CHRONIC IDIOPATHIC CONSTIPATION: Primary | ICD-10-CM

## 2022-04-19 DIAGNOSIS — K62.5 RECTAL BLEEDING: ICD-10-CM

## 2022-04-19 PROCEDURE — 99212 OFFICE O/P EST SF 10 MIN: CPT | Performed by: INTERNAL MEDICINE

## 2022-04-19 RX ORDER — BRIMONIDINE TARTRATE 2 MG/ML
SOLUTION/ DROPS OPHTHALMIC
COMMUNITY
Start: 2022-04-18

## 2022-04-19 RX ORDER — PLECANATIDE 3 MG/1
1 TABLET ORAL DAILY
Qty: 90 TABLET | Refills: 3 | Status: SHIPPED | OUTPATIENT
Start: 2022-04-19 | End: 2023-01-09

## 2022-04-19 RX ORDER — PANTOPRAZOLE SODIUM 40 MG/1
40 TABLET, DELAYED RELEASE ORAL DAILY
Qty: 90 TABLET | Refills: 3 | Status: SHIPPED | OUTPATIENT
Start: 2022-04-19

## 2022-04-19 RX ORDER — TIMOLOL MALEATE 5 MG/ML
SOLUTION/ DROPS OPHTHALMIC
COMMUNITY
Start: 2022-02-12

## 2022-04-19 RX ORDER — HYDROCORTISONE 25 MG/G
CREAM TOPICAL 2 TIMES DAILY
Qty: 28 G | Refills: 3 | Status: SHIPPED | OUTPATIENT
Start: 2022-04-19 | End: 2022-04-29

## 2022-04-19 NOTE — PROGRESS NOTES
Chief Complaint   Patient presents with   • Constipation       Angelic Cardona is a  86 y.o. female here for a follow up visit for chronic idiopathic constipation.    HPI     Patient 86-year-old female with history of hypertension, hyperlipidemia and GERD with chronic idiopathic constipation and occasional rectal bleeding with hard stools.  Patient currently doing better but having diarrhea on Linzess.  Patient was not sure what she was supposed to be on.  Patient had been on Linzess but that is too strong even at 72 mcg.  Patient tried Motegrity with limited improvement but Trulance seem to be the best of the 3.    Past Medical History:   Diagnosis Date   • Atrial fibrillation (HCC)    • Carpal tunnel syndrome    • Chronic constipation    • Chronic kidney disease 03/26/2022   • GERD (gastroesophageal reflux disease)    • Hyperlipidemia    • Hypertension    • Irritable bowel syndrome 2005         Current Outpatient Medications:   •  amiodarone (PACERONE) 200 MG tablet, Take 200 mg by mouth., Disp: , Rfl:   •  amLODIPine (NORVASC) 10 MG tablet, Take 10 mg by mouth Daily., Disp: , Rfl:   •  apixaban (ELIQUIS) 5 MG tablet tablet, Take 5 mg by mouth 2 (Two) Times a Day., Disp: , Rfl:   •  brimonidine (ALPHAGAN) 0.2 % ophthalmic solution, , Disp: , Rfl:   •  carvedilol (COREG) 25 MG tablet, Take 25 mg by mouth 2 (Two) Times a Day With Meals., Disp: , Rfl:   •  cloNIDine (CATAPRES) 0.1 MG tablet, TAKE 1 TABLET BY MOUTH THREE TIMES A DAY, Disp: , Rfl:   •  diclofenac (VOLTAREN) 75 MG EC tablet, TAKE 1 TABLET BY MOUTH TWICE A DAY WITH FOOD AS NEEDED FOR ARTHRITIS, Disp: , Rfl:   •  furosemide (LASIX) 20 MG tablet, Take 40 mg by mouth 2 (Two) Times a Day., Disp: , Rfl:   •  Multiple Vitamins-Minerals (MULTIVITAMIN ADULT PO), Take  by mouth., Disp: , Rfl:   •  pantoprazole (PROTONIX) 40 MG EC tablet, Take 1 tablet by mouth Daily., Disp: 90 tablet, Rfl: 3  •  Plecanatide (Trulance) 3 MG tablet, Take 1 tablet by mouth Daily.,  Disp: 90 tablet, Rfl: 3  •  timolol (TIMOPTIC) 0.5 % ophthalmic solution, INSTILL 1 DROP INTO BOTH EYES TWICE A DAY, Disp: , Rfl:   •  Combigan 0.2-0.5 % ophthalmic solution, , Disp: , Rfl:   •  valsartan (DIOVAN) 320 MG tablet, Take 320 mg by mouth Daily., Disp: , Rfl:     Allergies   Allergen Reactions   • Shellfish-Derived Products Nausea And Vomiting       Social History     Socioeconomic History   • Marital status:    Tobacco Use   • Smoking status: Never Smoker   • Smokeless tobacco: Never Used   Substance and Sexual Activity   • Alcohol use: No   • Drug use: No       Family History   Problem Relation Age of Onset   • Colon cancer Father        Review of Systems   Constitutional: Negative.    Respiratory: Negative.    Cardiovascular: Negative.    Gastrointestinal: Positive for anal bleeding and constipation. Negative for abdominal distention, abdominal pain, blood in stool, diarrhea, nausea, rectal pain and vomiting.   Musculoskeletal: Positive for arthralgias and gait problem.   Skin: Negative.    Hematological: Negative.        Vitals:    04/19/22 1051   BP: 123/75   Pulse: 55   Temp: 96.4 °F (35.8 °C)       Physical Exam  Vitals reviewed.   Constitutional:       Appearance: Normal appearance. She is well-developed.   HENT:      Head: Normocephalic and atraumatic.   Eyes:      General: No scleral icterus.     Pupils: Pupils are equal, round, and reactive to light.   Pulmonary:      Effort: Pulmonary effort is normal. No respiratory distress.      Breath sounds: No wheezing or rales.   Abdominal:      General: Bowel sounds are normal. There is no distension.      Palpations: Abdomen is soft. There is no mass.      Tenderness: There is no abdominal tenderness.      Hernia: No hernia is present.   Skin:     General: Skin is warm and dry.      Coloration: Skin is not jaundiced.      Findings: No rash.   Neurological:      General: No focal deficit present.      Mental Status: She is alert and oriented to  person, place, and time.      Cranial Nerves: No cranial nerve deficit.   Psychiatric:         Behavior: Behavior normal.         Thought Content: Thought content normal.         Judgment: Judgment normal.         No visits with results within 2 Month(s) from this visit.   Latest known visit with results is:   Admission on 11/06/2017, Discharged on 11/06/2017   Component Date Value Ref Range Status   • Urine Culture 11/06/2017 Final report   Final   • Result 1 11/06/2017 No growth   Final   • Color 11/06/2017 Orange (A) Yellow, Straw, Dark Yellow, Bell Final   • Clarity, UA 11/06/2017 Slightly Cloudy (A) Clear Final   • Glucose, UA 11/06/2017 100 mg/dL (A) Negative, 1000 mg/dL (3+) mg/dL Final   • Bilirubin 11/06/2017 Negative  Negative Final   • Ketones, UA 11/06/2017 Negative  Negative Final   • Specific Gravity  11/06/2017 1.015  1.005 - 1.030 Final   • Blood, UA 11/06/2017 Negative  Negative Final   • pH, Urine 11/06/2017 6.0  5.0 - 8.0 Final   • Protein, POC 11/06/2017 Trace (A) Negative mg/dL Final   • Urobilinogen, UA 11/06/2017 1 E.U./dL  (A) Normal Final   • Leukocytes 11/06/2017 Negative  Negative Final   • Nitrite, UA 11/06/2017 Positive (A) Negative Final       Diagnoses and all orders for this visit:    1. Chronic idiopathic constipation (Primary)    2. Rectal bleeding      Patient 86-year-old female with history of GERD with history of constipation and rectal bleeding along with GERD doing well on current therapy.  Patient with some confusion about what she is supposed to take.  Currently on Linzess but this causes diarrhea.  Patient apparently did best with Trulance, will continue that daily.  Patient to hold the Motegrity and the Linzess for now and will adjust as needed.  Patient was off the laxatives for several days when she ended up with pulmonary edema and had some issues after discharge now more regular.  We will follow-up clinically patient to call for any changes.

## 2022-07-06 ENCOUNTER — TELEPHONE (OUTPATIENT)
Dept: GASTROENTEROLOGY | Facility: CLINIC | Age: 87
End: 2022-07-06

## 2022-07-06 NOTE — TELEPHONE ENCOUNTER
Returned patient's phone call.   She states she is having lower abdominal cramping, gas, and issues with an external hemorrhoid.  When she pushes for a BM it causes the hemorrhoid to bleed.    States she took Miralax yesterday and had 2 good BM's yesterday.   Today she took Trulance today. Advised patient to hold her Trulance and will send an update to [facility].       States she is urinating, but not as good as it has been. States she has gained 2 lbs.  Advised patient to call her cardiologist.   Update to [facility].

## 2022-07-06 NOTE — TELEPHONE ENCOUNTER
Caller: Angelic Cardona    Relationship: Self    Best call back number:[phone], [phone]    What medications are you currently taking:   Current Outpatient Medications on File Prior to Visit   Medication Sig Dispense Refill   • amiodarone (PACERONE) 200 MG tablet Take 200 mg by mouth.     • amLODIPine (NORVASC) 10 MG tablet Take 10 mg by mouth Daily.     • apixaban (ELIQUIS) 5 MG tablet tablet Take 5 mg by mouth 2 (Two) Times a Day.     • brimonidine (ALPHAGAN) 0.2 % ophthalmic solution      • carvedilol (COREG) 25 MG tablet Take 25 mg by mouth 2 (Two) Times a Day With Meals.     • cloNIDine (CATAPRES) 0.1 MG tablet TAKE 1 TABLET BY MOUTH THREE TIMES A DAY     • Combigan 0.2-0.5 % ophthalmic solution      • diclofenac (VOLTAREN) 75 MG EC tablet TAKE 1 TABLET BY MOUTH TWICE A DAY WITH FOOD AS NEEDED FOR ARTHRITIS     • furosemide (LASIX) 20 MG tablet Take 40 mg by mouth 2 (Two) Times a Day.     • Multiple Vitamins-Minerals (MULTIVITAMIN ADULT PO) Take  by mouth.     • pantoprazole (PROTONIX) 40 MG EC tablet Take 1 tablet by mouth Daily. 90 tablet 3   • Plecanatide (Trulance) 3 MG tablet Take 1 tablet by mouth Daily. 90 tablet 3   • timolol (TIMOPTIC) 0.5 % ophthalmic solution INSTILL 1 DROP INTO BOTH EYES TWICE A DAY     • valsartan (DIOVAN) 320 MG tablet Take 320 mg by mouth Daily.       No current facility-administered medications on file prior to visit.          When did you start taking these medications: 4/19/22    Which medication are you concerned about: THE MEDICATIONS PRESCRIBED MOST RECENTLY WITH DR. THORPE, MAINLY THE TRUELANCE    Who prescribed you this medication: DR. THORPE    What are your concerns: SHE IS HAVING CRAMPING AND BLEEDING, FEELS AS IF SHE IS HOLDING WATER AND AND AS IF IT CAUSES HER TO PUSH WHICH IS PUSHING HER HEMORRHOIDS  How long have you had these concerns:  PT STATES SHE HAS FELT THIS WAY FOR A WHILE AND SHOULD HAVE CALLED SOONER.         DELETE AFTER READING TO PATIENT:  “Thank you for sharing this information with me. I will send a message to the clinical team. Please allow 48 hours for the clinical staff to follow up on this request.  If your symptoms worsen, please seek emergent medical attention.”

## 2022-07-08 ENCOUNTER — TELEPHONE (OUTPATIENT)
Dept: GASTROENTEROLOGY | Facility: CLINIC | Age: 87
End: 2022-07-08

## 2022-07-08 NOTE — TELEPHONE ENCOUNTER
Caller: Angelic Cardona    Relationship to patient: Self    Best call back number: [phone]    Patient is needing: RETURNING A MISSED CALL, HAVING ISSUES WITH STUBBORN BOWEL MOVEMENTS (STRAINING) AND CAUSING HEMRHOIDS WITH BLEEDING.

## 2022-07-11 NOTE — TELEPHONE ENCOUNTER
Caller: Angelic Cardona    Relationship to patient: Self    Best call back number: [phone]    Patient is needing: PATIENT HAVING ISSUES WITH BLEEDING WITH BOWEL MOVEMENTS POSSIBLE PRESSURE CAUSING EXTERNAL HEMORRHOIDS.     PLEASE CALL PATIENT.

## 2022-08-30 ENCOUNTER — OFFICE VISIT (OUTPATIENT)
Dept: GASTROENTEROLOGY | Facility: CLINIC | Age: 87
End: 2022-08-30

## 2022-08-30 VITALS
BODY MASS INDEX: 39.49 KG/M2 | DIASTOLIC BLOOD PRESSURE: 71 MMHG | WEIGHT: 237 LBS | TEMPERATURE: 96.4 F | HEART RATE: 64 BPM | HEIGHT: 65 IN | SYSTOLIC BLOOD PRESSURE: 131 MMHG

## 2022-08-30 DIAGNOSIS — K62.5 RECTAL BLEEDING: Primary | ICD-10-CM

## 2022-08-30 DIAGNOSIS — K64.8 PROLAPSED HEMORRHOIDS: ICD-10-CM

## 2022-08-30 PROCEDURE — 99213 OFFICE O/P EST LOW 20 MIN: CPT | Performed by: INTERNAL MEDICINE

## 2022-08-30 RX ORDER — HYDROCORTISONE 25 MG/G
CREAM TOPICAL 2 TIMES DAILY
Qty: 28 G | Refills: 2 | Status: SHIPPED | OUTPATIENT
Start: 2022-08-30 | End: 2022-09-13

## 2022-08-30 NOTE — PROGRESS NOTES
Chief Complaint   Patient presents with   • Rectal Bleeding   • Abdominal Pain       Angelic Cardona is a  87 y.o. female here for a follow up visit for rectal bleeding and abdominal pain.    HPI     Patient 87-year-old female with history renal insufficiency, atrial fibrillation, hypertension hyperlipidemia complaining of upper abdominal pain and rectal bleeding.  Patient's off of her Trulance using MiraLAX and prune juice to good effect.  Patient however reports ongoing rectal bleeding with prolapsing hemorrhoids.  Colonoscopy last year showed only the prolapsing hemorrhoids remainder the colon was normal.  Patient here for further recommendations.    Past Medical History:   Diagnosis Date   • Anemia 30+ years    Recent Hgb 8.9   • Atrial fibrillation (HCC)    • Carpal tunnel syndrome    • Chronic constipation    • Chronic kidney disease 03/26/2022   • Clotting disorder (HCC) 2021    Bleeding hemorrhoids   • Colon polyp 2012   • Coronary artery disease 2021    AFib   • GERD (gastroesophageal reflux disease)    • Hyperlipidemia    • Hypertension    • Irritable bowel syndrome 2005         Current Outpatient Medications:   •  amiodarone (PACERONE) 200 MG tablet, Take 200 mg by mouth., Disp: , Rfl:   •  apixaban (ELIQUIS) 5 MG tablet tablet, Take 2.5 mg by mouth 2 (Two) Times a Day., Disp: , Rfl:   •  brimonidine (ALPHAGAN) 0.2 % ophthalmic solution, , Disp: , Rfl:   •  carvedilol (COREG) 25 MG tablet, Take 25 mg by mouth 2 (Two) Times a Day With Meals., Disp: , Rfl:   •  Combigan 0.2-0.5 % ophthalmic solution, , Disp: , Rfl:   •  diclofenac (VOLTAREN) 75 MG EC tablet, TAKE 1 TABLET BY MOUTH TWICE A DAY WITH FOOD AS NEEDED FOR ARTHRITIS, Disp: , Rfl:   •  furosemide (LASIX) 20 MG tablet, Take 80 mg by mouth 2 (Two) Times a Day., Disp: , Rfl:   •  Multiple Vitamins-Minerals (MULTIVITAMIN ADULT PO), Take  by mouth., Disp: , Rfl:   •  pantoprazole (PROTONIX) 40 MG EC tablet, Take 1 tablet by mouth Daily., Disp: 90 tablet,  Rfl: 3  •  Polyethylene Glycol 3350 (MIRALAX PO), Take  by mouth Daily., Disp: , Rfl:   •  timolol (TIMOPTIC) 0.5 % ophthalmic solution, INSTILL 1 DROP INTO BOTH EYES TWICE A DAY, Disp: , Rfl:   •  amLODIPine (NORVASC) 10 MG tablet, Take 10 mg by mouth Daily., Disp: , Rfl:   •  cloNIDine (CATAPRES) 0.1 MG tablet, TAKE 1 TABLET BY MOUTH THREE TIMES A DAY, Disp: , Rfl:   •  Hydrocortisone, Perianal, (ANUSOL-HC) 2.5 % rectal cream, Insert  into the rectum 2 (Two) Times a Day for 14 days., Disp: 28 g, Rfl: 2  •  Plecanatide (Trulance) 3 MG tablet, Take 1 tablet by mouth Daily., Disp: 90 tablet, Rfl: 3  •  valsartan (DIOVAN) 320 MG tablet, Take 320 mg by mouth Daily., Disp: , Rfl:     Allergies   Allergen Reactions   • Shellfish-Derived Products Nausea And Vomiting       Social History     Socioeconomic History   • Marital status:    Tobacco Use   • Smoking status: Never Smoker   • Smokeless tobacco: Never Used   Substance and Sexual Activity   • Alcohol use: No   • Drug use: No       Family History   Problem Relation Age of Onset   • Colon cancer Father        Review of Systems   Constitutional: Negative.    Respiratory: Negative.    Cardiovascular: Negative.    Gastrointestinal: Positive for abdominal distention, abdominal pain, anal bleeding and constipation. Negative for diarrhea, nausea, rectal pain and vomiting.   Musculoskeletal: Positive for arthralgias and gait problem.   Skin: Negative.    Hematological: Negative.        Vitals:    08/30/22 0855   BP: 131/71   Pulse: 64   Temp: 96.4 °F (35.8 °C)       Physical Exam  Vitals reviewed.   Constitutional:       Appearance: Normal appearance. She is well-developed. She is obese.   HENT:      Head: Normocephalic and atraumatic.   Eyes:      General: Scleral icterus present.      Pupils: Pupils are equal, round, and reactive to light.   Pulmonary:      Effort: Pulmonary effort is normal. No respiratory distress.      Breath sounds: Normal breath sounds.    Abdominal:      General: Bowel sounds are normal. There is no distension.      Palpations: Abdomen is soft. There is no mass.      Tenderness: There is no abdominal tenderness.      Hernia: No hernia is present.   Skin:     General: Skin is warm and dry.      Coloration: Skin is not jaundiced.      Findings: No rash.   Neurological:      General: No focal deficit present.      Mental Status: She is alert and oriented to person, place, and time.      Cranial Nerves: No cranial nerve deficit.   Psychiatric:         Behavior: Behavior normal.         Thought Content: Thought content normal.         Judgment: Judgment normal.         No visits with results within 2 Month(s) from this visit.   Latest known visit with results is:   Admission on 11/06/2017, Discharged on 11/06/2017   Component Date Value Ref Range Status   • Urine Culture 11/06/2017 Final report   Final   • Result 1 11/06/2017 No growth   Final   • Color 11/06/2017 Orange (A) Yellow, Straw, Dark Yellow, Bell Final   • Clarity, UA 11/06/2017 Slightly Cloudy (A) Clear Final   • Glucose, UA 11/06/2017 100 mg/dL (A) Negative, 1000 mg/dL (3+) mg/dL Final   • Bilirubin 11/06/2017 Negative  Negative Final   • Ketones, UA 11/06/2017 Negative  Negative Final   • Specific Gravity  11/06/2017 1.015  1.005 - 1.030 Final   • Blood, UA 11/06/2017 Negative  Negative Final   • pH, Urine 11/06/2017 6.0  5.0 - 8.0 Final   • Protein, POC 11/06/2017 Trace (A) Negative mg/dL Final   • Urobilinogen, UA 11/06/2017 1 E.U./dL  (A) Normal Final   • Leukocytes 11/06/2017 Negative  Negative Final   • Nitrite, UA 11/06/2017 Positive (A) Negative Final       Diagnoses and all orders for this visit:    1. Rectal bleeding (Primary)  -     Ambulatory Referral to Colorectal Surgery    2. Prolapsed hemorrhoids  -     Ambulatory Referral to Colorectal Surgery    Other orders  -     Hydrocortisone, Perianal, (ANUSOL-HC) 2.5 % rectal cream; Insert  into the rectum 2 (Two) Times a Day for 14  days.  Dispense: 28 g; Refill: 2      Patient 87-year-old female with history of chronic idiopathic constipation, atrial fibrillation, hyperlipidemia and hypertension here for follow-up.  Patient reports having abdominal cramps with the Trulance finally stopped taking it now using MiraLAX and prune juice to good effect.  Patient reports bowels are more regular but is having trouble with rectal bleeding and pain.  Patient reports prolapsed hemorrhoids again an issue with her bleeding.  Patient reports using the Anusol we will refill this to make sure she has enough but will refer to Dr. Powers to take a look and see if there is some surgical option.  Patient did undergo ultrasound showing cholelithiasis with normal ducts normal liver otherwise normal ultrasound.  Patient to follow-up clinically.

## 2022-12-01 ENCOUNTER — TELEPHONE (OUTPATIENT)
Dept: GASTROENTEROLOGY | Facility: CLINIC | Age: 87
End: 2022-12-01

## 2022-12-01 NOTE — TELEPHONE ENCOUNTER
Caller: Angelic Cardona    Relationship to patient: Self    Best call back number:[phone]    Patient is needing: PATIENT MISSED NURSE CALL AND IS REQUESTING CALL BACK.

## 2022-12-02 NOTE — TELEPHONE ENCOUNTER
Returned patient's phone call. She states she no longer needs the Trulance. States she is currently using Miralax and hot prune juice for her issues.

## 2023-01-09 ENCOUNTER — OFFICE VISIT (OUTPATIENT)
Dept: SURGERY | Facility: CLINIC | Age: 88
End: 2023-01-09
Payer: MEDICARE

## 2023-01-09 VITALS — OXYGEN SATURATION: 97 % | WEIGHT: 226 LBS | HEIGHT: 65 IN | TEMPERATURE: 97.8 F | BODY MASS INDEX: 37.65 KG/M2

## 2023-01-09 DIAGNOSIS — K64.8 INTERNAL HEMORRHOIDS WITHOUT COMPLICATION: Primary | ICD-10-CM

## 2023-01-09 PROBLEM — Z71.9 HEALTH EDUCATION/COUNSELING: Status: ACTIVE | Noted: 2019-05-01

## 2023-01-09 PROBLEM — R10.13 MIDEPIGASTRIC PAIN: Status: ACTIVE | Noted: 2022-09-23

## 2023-01-09 PROBLEM — N39.0 ACUTE UTI: Status: ACTIVE | Noted: 2022-12-20

## 2023-01-09 PROBLEM — N18.30 CHRONIC KIDNEY DISEASE (CKD), STAGE III (MODERATE) (HCC): Status: ACTIVE | Noted: 2022-02-27

## 2023-01-09 PROBLEM — I48.0 PAROXYSMAL ATRIAL FIBRILLATION (HCC): Status: ACTIVE | Noted: 2019-05-21

## 2023-01-09 PROBLEM — I34.0 NON-RHEUMATIC MITRAL REGURGITATION: Status: ACTIVE | Noted: 2017-08-26

## 2023-01-09 PROBLEM — R10.11 ABDOMINAL PAIN, RIGHT UPPER QUADRANT: Status: ACTIVE | Noted: 2022-09-23

## 2023-01-09 PROBLEM — I50.9 CONGESTIVE HEART FAILURE (HCC): Status: ACTIVE | Noted: 2023-01-09

## 2023-01-09 PROBLEM — R00.1 SINUS BRADYCARDIA: Status: ACTIVE | Noted: 2017-04-25

## 2023-01-09 PROBLEM — Z79.01 ON ANTICOAGULANT THERAPY: Status: ACTIVE | Noted: 2017-08-26

## 2023-01-09 PROBLEM — I35.1 NONRHEUMATIC AORTIC VALVE INSUFFICIENCY: Status: ACTIVE | Noted: 2017-08-26

## 2023-01-09 PROBLEM — E83.52 HYPERCALCEMIA: Status: ACTIVE | Noted: 2022-03-30

## 2023-01-09 PROBLEM — I36.1 NON-RHEUMATIC TRICUSPID VALVE INSUFFICIENCY: Status: ACTIVE | Noted: 2017-08-26

## 2023-01-09 PROBLEM — N18.9 ANEMIA IN CHRONIC KIDNEY DISEASE: Status: ACTIVE | Noted: 2022-09-21

## 2023-01-09 PROBLEM — D63.1 ANEMIA IN CHRONIC KIDNEY DISEASE: Status: ACTIVE | Noted: 2022-09-21

## 2023-01-09 PROBLEM — E21.0 PRIMARY HYPERPARATHYROIDISM (HCC): Status: ACTIVE | Noted: 2022-03-30

## 2023-01-09 PROCEDURE — 99203 OFFICE O/P NEW LOW 30 MIN: CPT | Performed by: COLON & RECTAL SURGERY

## 2023-01-09 PROCEDURE — 46600 DIAGNOSTIC ANOSCOPY SPX: CPT | Performed by: COLON & RECTAL SURGERY

## 2023-01-09 RX ORDER — DICYCLOMINE HYDROCHLORIDE 10 MG/1
CAPSULE ORAL
COMMUNITY
Start: 2022-12-22

## 2023-01-09 RX ORDER — FUROSEMIDE 80 MG
TABLET ORAL
COMMUNITY
Start: 2022-10-27

## 2023-01-09 RX ORDER — CINACALCET 30 MG/1
TABLET, FILM COATED ORAL
COMMUNITY
Start: 2022-11-11

## 2023-01-09 RX ORDER — METOLAZONE 5 MG/1
5 TABLET ORAL DAILY
COMMUNITY
Start: 2022-09-19

## 2023-01-09 RX ORDER — POTASSIUM CHLORIDE 20 MEQ/1
TABLET, EXTENDED RELEASE ORAL
COMMUNITY
Start: 2022-10-15

## 2023-01-09 RX ORDER — LANOLIN ALCOHOL/MO/W.PET/CERES
325 CREAM (GRAM) TOPICAL DAILY
COMMUNITY
Start: 2023-01-03

## 2023-01-09 RX ORDER — CEFDINIR 300 MG/1
CAPSULE ORAL
COMMUNITY
Start: 2022-12-22

## 2023-01-09 RX ORDER — DIPHENOXYLATE HYDROCHLORIDE AND ATROPINE SULFATE 2.5; .025 MG/1; MG/1
TABLET ORAL EVERY 24 HOURS
COMMUNITY

## 2023-01-09 RX ORDER — NIACINAMIDE 500 MG
TABLET, EXTENDED RELEASE ORAL
COMMUNITY
Start: 2022-12-15

## 2023-01-09 RX ORDER — FLUTICASONE PROPIONATE 50 MCG
SPRAY, SUSPENSION (ML) NASAL
COMMUNITY
Start: 2022-11-09

## 2023-04-24 RX ORDER — PANTOPRAZOLE SODIUM 40 MG/1
40 TABLET, DELAYED RELEASE ORAL DAILY
Qty: 90 TABLET | Refills: 3 | Status: SHIPPED | OUTPATIENT
Start: 2023-04-24

## 2023-09-11 PROBLEM — I48.91 ATRIAL FIBRILLATION: Status: ACTIVE | Noted: 2022-02-24

## 2023-09-11 PROBLEM — N30.90 CYSTITIS: Status: ACTIVE | Noted: 2022-02-25

## 2023-09-11 PROBLEM — J96.01 ACUTE RESPIRATORY FAILURE WITH HYPOXIA: Status: ACTIVE | Noted: 2022-02-25

## 2023-09-11 PROBLEM — Z79.01 LONG TERM (CURRENT) USE OF ANTICOAGULANTS: Status: ACTIVE | Noted: 2022-02-24

## 2023-09-11 PROBLEM — N18.30 CHRONIC KIDNEY DISEASE, STAGE 3 UNSPECIFIED: Status: ACTIVE | Noted: 2022-02-25

## 2023-09-11 PROBLEM — N17.9 ACUTE KIDNEY FAILURE, UNSPECIFIED: Status: ACTIVE | Noted: 2022-02-24

## 2023-09-11 PROBLEM — E66.01 MORBID (SEVERE) OBESITY DUE TO EXCESS CALORIES: Status: ACTIVE | Noted: 2022-02-25

## 2023-09-11 PROBLEM — Z91.81 HISTORY OF FALLING: Status: ACTIVE | Noted: 2022-03-01

## 2023-09-11 RX ORDER — LANSOPRAZOLE 30 MG/1
CAPSULE, DELAYED RELEASE ORAL EVERY 24 HOURS
COMMUNITY

## 2023-09-11 RX ORDER — TRAZODONE HYDROCHLORIDE 100 MG/1
TABLET ORAL
COMMUNITY
Start: 2023-07-25

## 2023-09-11 RX ORDER — CLONIDINE HYDROCHLORIDE 0.1 MG/1
1 TABLET ORAL 3 TIMES DAILY
COMMUNITY
Start: 2023-02-10

## 2023-09-11 RX ORDER — TRAMADOL HYDROCHLORIDE 50 MG/1
50 TABLET ORAL
COMMUNITY
Start: 2023-02-26

## 2023-09-11 RX ORDER — DIAPER,BRIEF,INFANT-TODD,DISP
1 EACH MISCELLANEOUS EVERY 12 HOURS
COMMUNITY
End: 2023-09-15 | Stop reason: SDUPTHER

## 2023-09-11 RX ORDER — SIMETHICONE 180 MG
CAPSULE ORAL
COMMUNITY
Start: 2023-07-01

## 2023-09-11 RX ORDER — CALCIUM CARBONATE 750 MG/1
TABLET, CHEWABLE ORAL
COMMUNITY
Start: 2023-08-07

## 2023-09-11 RX ORDER — TRAZODONE HYDROCHLORIDE 50 MG/1
50-100 TABLET ORAL DAILY
Qty: 60 TABLET | Refills: 11 | COMMUNITY
Start: 2023-07-25 | End: 2023-09-15

## 2023-09-11 RX ORDER — MIRTAZAPINE 15 MG/1
15 TABLET, FILM COATED ORAL DAILY
Qty: 30 TABLET | Refills: 11 | COMMUNITY
Start: 2023-05-03 | End: 2024-05-02

## 2023-09-12 RX ORDER — HYDROCORTISONE 10 MG/G
1 CREAM TOPICAL EVERY 12 HOURS SCHEDULED
COMMUNITY
End: 2023-09-15

## 2023-09-12 RX ORDER — MULTIPLE VITAMINS W/ MINERALS TAB 9MG-400MCG
TAB ORAL EVERY 24 HOURS
COMMUNITY

## 2023-09-12 RX ORDER — POTASSIUM CHLORIDE 1.5 G/1.58G
POWDER, FOR SOLUTION ORAL EVERY 12 HOURS SCHEDULED
COMMUNITY

## 2023-09-15 ENCOUNTER — OFFICE VISIT (OUTPATIENT)
Dept: SURGERY | Facility: CLINIC | Age: 88
End: 2023-09-15
Payer: MEDICARE

## 2023-09-15 VITALS
OXYGEN SATURATION: 98 % | SYSTOLIC BLOOD PRESSURE: 130 MMHG | HEIGHT: 65 IN | HEART RATE: 61 BPM | BODY MASS INDEX: 35.52 KG/M2 | DIASTOLIC BLOOD PRESSURE: 60 MMHG | WEIGHT: 213.2 LBS

## 2023-09-15 DIAGNOSIS — K62.5 RECTAL BLEEDING: ICD-10-CM

## 2023-09-15 DIAGNOSIS — K64.8 INTERNAL HEMORRHOIDS WITH COMPLICATION: Primary | ICD-10-CM

## 2023-09-15 RX ORDER — HYDROCORTISONE 25 MG/G
CREAM TOPICAL
Qty: 30 G | Refills: 1 | Status: SHIPPED | OUTPATIENT
Start: 2023-09-15

## 2023-09-15 NOTE — PROGRESS NOTES
"Angelic Cardona is a 88 y.o. female in for follow up of rectal bleeding.    The patient states that she had rectal bleeding for 2 days before it stopped on its own.  She has not had rectal bleeding since 1 month ago.  Before that, she had 1 episode 3 weeks prior, but states that she does not normally bleed that frequently.  She states that she does not drink enough water.  She struggles with constipation and has tried MiraLAX, Linzess, and Motegrity without success.  Dr. Acosta recently prescribed Trulance, which the patient started 3 days ago; this seems to be working well for her so far.  She uses over the counter hemorrhoid wipes and creams.    /60 (BP Location: Left arm, Patient Position: Sitting, Cuff Size: Adult)   Pulse 61   Ht 165.1 cm (65\")   Wt 96.7 kg (213 lb 3.2 oz)   LMP  (LMP Unknown)   SpO2 98%   Breastfeeding No   BMI 35.48 kg/m²   Body mass index is 35.48 kg/m².  -  Physical Exam  No acute distress  Chaperone present  Perianal exam: external hemorrhoids mildly enlarged with prolapsing internal mucosa visible in the right posterior position. CHANTEL: no masses. Anoscopy performed: Grade 2-3 x 3 internal hemorrhoids    Review of medical record:  I reviewed colonoscopy performed by Dr. Saurav Acosta on 5/26/2021, significant for external and grade 3 internal hemorrhoids, otherwise normal.  5-year recall was recommended.      Assessment:   1. Internal hemorrhoids with complication    2. Rectal bleeding       Plan:  The patient would like to continue Trulance for now.  I have provided a prescription for anusol hemorrhoid cream and given detailed instructions.  She would like to follow-up as needed if symptoms persist or worsen.        Lisa Montiel PA-C  Physician Assistant  Colorectal Surgery    "

## 2024-01-29 ENCOUNTER — TELEPHONE (OUTPATIENT)
Dept: GASTROENTEROLOGY | Facility: CLINIC | Age: 89
End: 2024-01-29
Payer: MEDICARE

## 2024-01-29 ENCOUNTER — OFFICE VISIT (OUTPATIENT)
Dept: GASTROENTEROLOGY | Facility: CLINIC | Age: 89
End: 2024-01-29
Payer: MEDICARE

## 2024-01-29 VITALS — HEIGHT: 60 IN | BODY MASS INDEX: 38.77 KG/M2 | TEMPERATURE: 96.9 F | WEIGHT: 197.5 LBS

## 2024-01-29 DIAGNOSIS — K59.04 CHRONIC IDIOPATHIC CONSTIPATION: Primary | ICD-10-CM

## 2024-01-29 PROCEDURE — 99213 OFFICE O/P EST LOW 20 MIN: CPT | Performed by: NURSE PRACTITIONER

## 2024-01-29 PROCEDURE — 1160F RVW MEDS BY RX/DR IN RCRD: CPT | Performed by: NURSE PRACTITIONER

## 2024-01-29 PROCEDURE — 1159F MED LIST DOCD IN RCRD: CPT | Performed by: NURSE PRACTITIONER

## 2024-01-29 NOTE — PROGRESS NOTES
Chief Complaint   Patient presents with    Constipation       HPI    Angelic Cardona is a  88 y.o. female here for a follow up visit for constipation.    This patient follows with Dr. Acosta and myself.    Past medical history of renal insufficiency, atrial fibrillation, hypertension, hyperlipidemia chronic disease.    Patient with very long history of IBS with constipation.  She has tried Linzess which caused considerable cramps and was also expensive.  Trulance was too strong because diarrhea.  She is currently on MiraLAX and prune juice with variable management of symptoms.  She is tried stool softeners with no results.  She reports lower abdominal cramps that come and go.  With constipation she often experiences excessive straining which will irritate hemorrhoids.  She has been evaluated by Dr. Powers who offered conservative measures for hemorrhoids after examination in the office.    Reviewed colonoscopy 5/2021 with findings of non-bleeding and prolapse external and internal hemorrhoids otherwise normal.  Recall 5 years.    Past Medical History:   Diagnosis Date    Abdominal pain, right upper quadrant 09/23/2022    Abnormal EKG     FOLLOWED BY [facility].    Abnormal urinalysis     Acute bronchitis with bronchospasm 12/13/2016     U/C.    Acute conjunctivitis of left eye 03/06/2020     U/C.    Acute kidney failure, unspecified 02/24/2022    Acute respiratory failure with hypoxia 02/25/2022    Acute UTI 12/20/2022    MARYJANE (acute kidney injury)     Anemia 30+ years    Recent Hgb 8.9    Anemia in chronic kidney disease 09/21/2022    Arthritis     Unilateral primary osteoarthritis of right hip. Primary osteoarthritis of left shoulder.    At risk for falls     Atrial fibrillation 02/24/2022    Blood transfusion without reported diagnosis     Body mass index (BMI) 40.0-44.9, adult 02/25/2022    Carpal tunnel syndrome     Chest pain     Cholelithiasis 10/14/2022    EGD FINDING.    Chronic  congestive heart failure, unspecified heart failure type     Chronic hypertension     Chronic idiopathic constipation 04/04/2019    Chronic kidney disease 03/26/2022    Chronic kidney disease (CKD), stage III (moderate) 02/27/2022    Chronic left shoulder pain     Closed nondisplaced fracture of head of left radius, initial encounter 03/18/2021    [facility].    Clotting disorder 2021    Bleeding hemorrhoids    Colon polyp 2012    Congestive heart failure 01/09/2023    Coronary artery disease 2021    AFib    CTS (carpal tunnel syndrome) 08/15/2012    Cystitis 02/25/2022    Degeneration of lumbar intervertebral disc     Dependent edema     Diastolic heart failure     Dyspnea on exertion     Environmental allergies     Essential hypertension 08/15/2012    Fall 03/13/2021    head injury secondary to falling earlier today. Pt states she tripped on a cord and fell. She hit the left side of her face on the hardwood floor. She also has some LUE pain, right knee pain, left hip pain. [place].    Fatigue     Flash pulmonary edema     Gait abnormality     Gastroesophageal reflux disease without esophagitis 02/14/2017    Glaucoma     Health education/counseling 05/01/2019    Heart failure with preserved ejection fraction, unspecified HF chronicity     Hemorrhoids, unspecified hemorrhoid type     History of adenomatous polyp of colon 05/04/2021    Added automatically from request for surgery [id number]    History of cardioversion     History of falling 03/01/2022    Hypercalcemia 03/30/2022    Hyperglycemia     Hyperlipidemia     Hyperlipidemia LDL goal <100 08/15/2012    Hypoxia     Iron deficiency anemia due to chronic blood loss     Irritable bowel syndrome 2005    Left knee DJD 08/15/2012    Leukocytosis     Long term (current) use of anticoagulants 02/24/2022    Midepigastric pain 09/23/2022    Morbid (severe) obesity due to excess calories 02/25/2022    Morbid obesity with BMI of 40.0-44.9, adult  08/15/2012    Non-rheumatic mitral regurgitation     Non-rheumatic tricuspid valve insufficiency     Nonrheumatic aortic (valve) insufficiency     Nonrheumatic aortic valve insufficiency 08/26/2017    Nonsenile cataract     On amiodarone therapy     On anticoagulant therapy 08/26/2017    Onychomycosis of great toe     Paroxysmal atrial fibrillation     Peripheral edema     Persistent atrial fibrillation     FOLLOWED BY [facility].    Pleural effusion     PONV (postoperative nausea and vomiting)     Postmenopausal     Primary hyperparathyroidism 03/30/2022    Radiculopathy due to lumbar intervertebral disc disorder     Rectal bleeding 05/04/2021    Added automatically from request for surgery [id number]    Respiratory failure with hypoxia 02/25/2022    Sinus bradycardia     Sleep disturbance     SOB (shortness of breath) on exertion     Sprained ankle 07/11/2023    [facility] U/[facility].    UTI (urinary tract infection) 12/20/2022    Vitamin D deficiency     Weakness        Past Surgical History:   Procedure Laterality Date    CARDIOVERSION  04/25/2017    CARDIOVERSION  05/29/2019    Sergei Cooper MD @ [facility].    CARPAL TUNNEL RELEASE      COLONOSCOPY  01/11/2016    tics, NBIH, tubulovillous adenoma, HP polyp. DR. ALISSA THORPE.    COLONOSCOPY N/A 05/26/2021    NORMAL ILEUM. NON-BLEEDING PROLAPSED EXT/INT HEMORRHOIDS. OTHERWISE NORMAL. NO SPECIMENS COLLECTED. RESCOPE 5 YEARS. ALISSA THORPE.    COLONOSCOPY Bilateral 01/12/2011    IH, TA & HP COLON POLYPS.    DIAGNOSTIC ARTERIOGRAM WITH ULTRASOUND AORTO ILIAC FEMORAL RENAL ARTERIES  01/27/2023    RENAL ARTERIOGRAM WITH CO2. Payam Sifuentes MD @ [facility].    ENDOSCOPY  10/14/2022    Small hiatal hernia. - A single gastric HYPERPLASTIC polyp. Incomplete resection.  Resected tissue retrieved. - The examination was otherwise normal. - Biopsies were taken with a cold forceps for  Helicobacter pylori testing using CLOtest. SWATI CHAMBERS MD.    ENDOSCOPY   04/2009    EGD W/BRAVO, ESOPHAGITIS, GASTRITIS, SM HH, PATH NEG.    REPLACEMENT TOTAL KNEE Left 03/2004    2 x'S.    REPLACEMENT TOTAL KNEE Right     TUBAL ABDOMINAL LIGATION Bilateral     UPPER GASTROINTESTINAL ENDOSCOPY  01/11/2016    , no specimens collected       Scheduled Meds:     Continuous Infusions: No current facility-administered medications for this visit.      PRN Meds:     Allergies   Allergen Reactions    Tuberculin Tests Rash     Tests positive      Shellfish-Derived Products Nausea And Vomiting    Sulfa Antibiotics Unknown - Low Severity     Unknown reaction         Social History     Socioeconomic History    Marital status:    Tobacco Use    Smoking status: Never     Passive exposure: Never    Smokeless tobacco: Never   Vaping Use    Vaping Use: Never used   Substance and Sexual Activity    Alcohol use: No    Drug use: No    Sexual activity: Not Currently     Partners: Female     Birth control/protection: Post-menopausal, None, Tubal ligation       Family History   Problem Relation Age of Onset    Hypertension Mother     Diabetes Mother     Heart disease Mother     Cancer Father     Colon cancer Father     Arthritis Father     Stroke Sister     Colon polyps Sister     Stroke Brother        Review of Systems   Gastrointestinal:  Positive for constipation.       Vitals:    01/29/24 1314   Temp: 96.9 °F (36.1 °C)       Physical Exam  Constitutional:       Appearance: She is well-developed.   Abdominal:      General: Bowel sounds are normal. There is no distension.      Palpations: Abdomen is soft. There is no mass.      Tenderness: There is no abdominal tenderness. There is no guarding.      Hernia: No hernia is present.   Skin:     General: Skin is warm and dry.      Capillary Refill: Capillary refill takes less than 2 seconds.   Neurological:      Mental Status: She is alert and oriented to person, place, and time.   Psychiatric:         Behavior: Behavior normal.      Assessment    Diagnoses and all orders for this visit:    1. Chronic idiopathic constipation (Primary)    Plan    Very pleasant 88-year-old female seen today for chronic idiopathic constipation has tried and failed Linzess and Trulance currently on MiraLAX and prune juice.  We discussed the possibility of placing her on Amitiza if this is affordable with her insurance.  I will speak with our pharmacist to run her Medicare pharmaceutical policy to determine options.  We also talked about starting her on lactulose.  She is agreeable to either option as long as its affordable and works well.         CARLEE Nair  [facility]  [address] [place] [address]  Office: [phone]

## 2024-01-29 NOTE — TELEPHONE ENCOUNTER
----- Message from CARLEE Nair sent at 1/29/2024  2:26 PM EST -----  Regarding: Amitiza pricing  Can you call the patient and let her know Amitiza is covered on her insurance but is $47 a month.  If that is not doable we can try a cheaper option which would be lactulose.  Let me know what she would like to do.  Thanks Tova    ----- Message -----  From: Autumn Medrano  Sent: 1/29/2024   1:49 PM EST  To: CARLEE Nair    PA not required. The copay for 1 month is $47. The copay for 3 months is $141  ----- Message -----  From: Tova Courtney APRN  Sent: 1/29/2024   1:46 PM EST  To: Lisa Ortega, PharmD    Could you help me try and get her Amitiza?  She tried and failed Trulance and Linzess both caused diarrhea and were too expensive.  Thanks Tova

## 2024-01-29 NOTE — Clinical Note
Could you help me try and get her Amitiza?  She tried and failed Trulance and Linzess both caused diarrhea and were too expensive.  Thanks Tova

## 2024-03-11 RX ORDER — PANTOPRAZOLE SODIUM 40 MG/1
40 TABLET, DELAYED RELEASE ORAL DAILY
Qty: 90 TABLET | Refills: 3 | Status: SHIPPED | OUTPATIENT
Start: 2024-03-11

## 2024-04-09 RX ORDER — HYDROCORTISONE 25 MG/G
CREAM TOPICAL
Qty: 56.7 G | Refills: 1 | Status: SHIPPED | OUTPATIENT
Start: 2024-04-09

## 2024-05-14 ENCOUNTER — TELEPHONE (OUTPATIENT)
Dept: GASTROENTEROLOGY | Facility: CLINIC | Age: 89
End: 2024-05-14
Payer: MEDICARE

## 2024-05-14 NOTE — TELEPHONE ENCOUNTER
Hub to relay and reschedule with Tova as Acosta is leaving our office  Called pt to reschedule 8/27 appt due to Karla leaving. Left vm.

## 2024-05-24 ENCOUNTER — TELEPHONE (OUTPATIENT)
Dept: GASTROENTEROLOGY | Facility: CLINIC | Age: 89
End: 2024-05-24

## 2024-05-31 ENCOUNTER — TELEPHONE (OUTPATIENT)
Dept: GASTROENTEROLOGY | Facility: CLINIC | Age: 89
End: 2024-05-31
Payer: MEDICARE

## 2024-05-31 NOTE — TELEPHONE ENCOUNTER
Please send a prescription to the pharmacy for the Linzess so we can submit a PA. There is not a rx in the chart

## 2024-05-31 NOTE — TELEPHONE ENCOUNTER
Patient message via my chart. I need prescription approval for linzess through [facility] thank you     Update to MA's.

## 2024-08-21 ENCOUNTER — TELEPHONE (OUTPATIENT)
Dept: GASTROENTEROLOGY | Facility: CLINIC | Age: 89
End: 2024-08-21
Payer: MEDICARE

## 2024-08-21 ENCOUNTER — TELEPHONE (OUTPATIENT)
Dept: GASTROENTEROLOGY | Facility: CLINIC | Age: 89
End: 2024-08-21

## 2024-08-21 NOTE — TELEPHONE ENCOUNTER
Hub staff attempted to follow warm transfer process and was unsuccessful     Caller: Angelic Cardona    Relationship to patient: Self    Best call back number:  [phone]    Patient is needing: PT WOULD LIKE TO CHANGE HER APPT TO A TELEHEALTH VISIT, PLEASE CALL AND ADVISE. HER APPT IS SCHEDULED FOR 08/23/2024.

## 2024-08-21 NOTE — TELEPHONE ENCOUNTER
She will have to check with her insurance to see if they will cover a telehealth visit before we can change her appointment over.

## 2024-08-21 NOTE — TELEPHONE ENCOUNTER
Called pt and left detailed msg on vm advising of Tova NP's note  Advised to call back if any questions.

## 2024-08-23 ENCOUNTER — TELEPHONE (OUTPATIENT)
Dept: GASTROENTEROLOGY | Facility: CLINIC | Age: 89
End: 2024-08-23
Payer: MEDICARE

## 2024-08-23 ENCOUNTER — TELEPHONE (OUTPATIENT)
Dept: GASTROENTEROLOGY | Facility: CLINIC | Age: 89
End: 2024-08-23

## 2024-08-23 NOTE — TELEPHONE ENCOUNTER
Hub staff attempted to follow warm transfer process and was unsuccessful     Caller: Angelic Cardona    Relationship to patient: Self    Best call back number: [phone]     Patient is needing: PATIENT STATES SHE WAS TALKING WITH CARLEE HERNANDEZ AND GOT DISCONNECTED.  PLEASE CALL BACK.

## 2024-08-23 NOTE — TELEPHONE ENCOUNTER
Called and left message for patient to get her checked in for her appointment with Tova. To please give us a call back.

## 2024-08-27 NOTE — TELEPHONE ENCOUNTER
Called pt, she states she has a lot of stomach pain when her bowels are not moving regularly.  States Linzess 72mcg will work for a couple of days and then she does not have a BM and stomach starts to hurts and she has a lot of gas.  States she is wondering if she needs to double her Linzess, currently has a 90 day supply.  Advised will send a msg to NICOLAS Bernardo.

## 2024-08-29 NOTE — TELEPHONE ENCOUNTER
Called pt and spoke with pt and granddaughter andadvised of Tova VALENZUELA's note.  They verbalized understanding.

## 2024-08-29 NOTE — TELEPHONE ENCOUNTER
I think it would be a good idea for her to take Linzess 145 mcg once daily.  She can  samples or we can send the prescription adjustment to her pharmacy.  Have her keep us posted on symptoms.

## 2024-11-12 ENCOUNTER — TELEPHONE (OUTPATIENT)
Dept: GASTROENTEROLOGY | Facility: CLINIC | Age: 89
End: 2024-11-12
Payer: MEDICARE

## 2024-11-12 RX ORDER — HYDROCORTISONE ACETATE 25 MG/1
25 SUPPOSITORY RECTAL NIGHTLY
Qty: 7 SUPPOSITORY | Refills: 0 | Status: SHIPPED | OUTPATIENT
Start: 2024-11-12 | End: 2024-11-19

## 2024-11-12 NOTE — TELEPHONE ENCOUNTER
Patient called. Spoke with her daughter Maryuri who is listed on [facility] advised as per Tova's note. She verb understanding. Advised to call back if the suppositories are too expensive. There is not a goodrx coupon for suppositories. She verb understanding.

## 2024-11-12 NOTE — TELEPHONE ENCOUNTER
I called Anusol to her pharmacy.  She may need to use a GoodRx card.  Please review that with her.  Have her come in for an updated CBC as well.

## 2024-11-12 NOTE — TELEPHONE ENCOUNTER
Patient sent CAD Best message below.    With Provider: GUNNER MCCARTNEY [[facility]]  Preferred Date Range: 11/18/2024 - 11/21/2024  Preferred Times: Any Time  Reason for Visit: Follow-up  Comments: My hemorrhoids are bleeding more than usual and I’m passing dark red clots with my stool.

## 2024-12-13 ENCOUNTER — OFFICE VISIT (OUTPATIENT)
Dept: SURGERY | Facility: CLINIC | Age: 89
End: 2024-12-13
Payer: MEDICARE

## 2024-12-13 VITALS
HEIGHT: 60 IN | OXYGEN SATURATION: 100 % | DIASTOLIC BLOOD PRESSURE: 80 MMHG | BODY MASS INDEX: 38.28 KG/M2 | SYSTOLIC BLOOD PRESSURE: 126 MMHG | HEART RATE: 52 BPM | WEIGHT: 195 LBS

## 2024-12-13 DIAGNOSIS — K62.5 RECTAL BLEEDING: ICD-10-CM

## 2024-12-13 DIAGNOSIS — K59.00 CONSTIPATION, UNSPECIFIED CONSTIPATION TYPE: ICD-10-CM

## 2024-12-13 DIAGNOSIS — K64.8 INTERNAL HEMORRHOIDS WITH COMPLICATION: Primary | ICD-10-CM

## 2024-12-13 RX ORDER — HYDROCORTISONE 25 MG/G
CREAM TOPICAL
Qty: 30 G | Refills: 1 | Status: SHIPPED | OUTPATIENT
Start: 2024-12-13

## 2024-12-13 RX ORDER — NEOMYCIN SULFATE, POLYMYXIN B SULFATE, AND DEXAMETHASONE 3.5; 10000; 1 MG/G; [USP'U]/G; MG/G
OINTMENT OPHTHALMIC
COMMUNITY
Start: 2024-11-09

## 2024-12-13 RX ORDER — DOXAZOSIN 4 MG/1
4 TABLET ORAL DAILY
COMMUNITY
Start: 2024-10-28 | End: 2025-10-28

## 2024-12-13 NOTE — PROGRESS NOTES
"Angelic Cardona is a 89 y.o. female in for follow up of Internal hemorrhoids with complication    Rectal bleeding    Constipation, unspecified constipation type    Pt presents today for evaluation of hemorrhoids and constipation.  Pt is followed by GI for constipation and is currently taking Linzess 72 mcg.  Feels that this has helped with her bowel movements, but continues to strain.  First portion of stool is hard.  Has to strain a lot to pass it, which then aggravates the hemorrhoids.   Subsequent stools are usually not as hard.  Hemorrhoids were bleeding a few weeks ago.  Pt is on Eliquis.   GI called in an Rx for Anusol-HC 25 mg suppositories.   Pt reports resolution of bleeding with use of suppositories.  She also notes that suppositories were expensive. She does have HC cream at home, but is almost out of this.   Requesting help in regards to her constipation.      /80 (BP Location: Left arm, Patient Position: Sitting, Cuff Size: Adult)   Pulse 52   Ht 152.4 cm (60\")   Wt 88.5 kg (195 lb)   LMP  (LMP Unknown)   SpO2 100%   BMI 38.08 kg/m²   Body mass index is 38.08 kg/m².      PE:   Physical Exam  Constitutional:       General: She is not in acute distress.     Appearance: She is well-developed.   HENT:      Head: Normocephalic and atraumatic.   Abdominal:      General: There is no distension.      Palpations: Abdomen is soft.   Musculoskeletal:         General: Normal range of motion.   Neurological:      Mental Status: She is alert.   Psychiatric:         Thought Content: Thought content normal.         Assessment:   1. Internal hemorrhoids with complication    2. Rectal bleeding    3. Constipation, unspecified constipation type      Plan:   - Pt is followed by GI for management of constipation. Currently on Linzess 72 mcg, but continues to experience constipation. May consider increasing dose to 145 mcg. Pt provided samples of this and recommended to follow up with GI. Also recommended Miralax " in addition to Linzess if needed. Cautioned Pt as this may cause diarrhea.   - Unable to perform rectal exam today as Pt reports recent injury to her left foot. She is currently in a wheelchair and not able to bear weight on her left foot without pain. Refills for HC 2.5% cream sent to pharmacy.   - Follow up as needed.    known

## 2025-01-30 DIAGNOSIS — K59.00 CONSTIPATION, UNSPECIFIED CONSTIPATION TYPE: ICD-10-CM

## 2025-01-30 DIAGNOSIS — K64.8 INTERNAL HEMORRHOIDS WITH COMPLICATION: ICD-10-CM

## 2025-01-30 DIAGNOSIS — K62.5 RECTAL BLEEDING: ICD-10-CM

## 2025-03-18 RX ORDER — HYDROCORTISONE ACETATE 25 MG/1
25 SUPPOSITORY RECTAL 2 TIMES DAILY
Qty: 14 SUPPOSITORY | Refills: 0 | Status: SHIPPED | OUTPATIENT
Start: 2025-03-18 | End: 2025-03-25

## 2025-04-16 DIAGNOSIS — K64.8 INTERNAL HEMORRHOIDS WITH COMPLICATION: ICD-10-CM

## 2025-04-16 DIAGNOSIS — K59.00 CONSTIPATION, UNSPECIFIED CONSTIPATION TYPE: ICD-10-CM

## 2025-04-16 DIAGNOSIS — K62.5 RECTAL BLEEDING: ICD-10-CM

## 2025-04-16 RX ORDER — LINACLOTIDE 145 UG/1
145 CAPSULE, GELATIN COATED ORAL
Qty: 90 CAPSULE | Refills: 2 | Status: SHIPPED | OUTPATIENT
Start: 2025-04-16

## 2025-04-18 RX ORDER — HYDROCORTISONE 25 MG/G
CREAM TOPICAL
Qty: 30 G | Refills: 1 | Status: SHIPPED | OUTPATIENT
Start: 2025-04-18

## 2025-06-23 RX ORDER — HYDROCORTISONE 25 MG/G
CREAM TOPICAL
Qty: 60 G | Refills: 3 | Status: SHIPPED | OUTPATIENT
Start: 2025-06-23

## (undated) DEVICE — THE TORRENT IRRIGATION SCOPE CONNECTOR IS USED WITH THE TORRENT IRRIGATION TUBING TO PROVIDE IRRIGATION FLUIDS SUCH AS STERILE WATER DURING GASTROINTESTINAL ENDOSCOPIC PROCEDURES WHEN USED IN CONJUNCTION WITH AN IRRIGATION PUMP (OR ELECTROSURGICAL UNIT).: Brand: TORRENT

## (undated) DEVICE — SENSR O2 OXIMAX FNGR A/ 18IN NONSTR

## (undated) DEVICE — KT ORCA ORCAPOD DISP STRL

## (undated) DEVICE — LN SMPL CO2 SHTRM SD STREAM W/M LUER

## (undated) DEVICE — TUBING, SUCTION, 1/4" X 10', STRAIGHT: Brand: MEDLINE

## (undated) DEVICE — CANN O2 ETCO2 FITS ALL CONN CO2 SMPL A/ 7IN DISP LF

## (undated) DEVICE — ADAPT CLN BIOGUARD AIR/H2O DISP